# Patient Record
Sex: MALE | Race: WHITE | Employment: OTHER | URBAN - METROPOLITAN AREA
[De-identification: names, ages, dates, MRNs, and addresses within clinical notes are randomized per-mention and may not be internally consistent; named-entity substitution may affect disease eponyms.]

---

## 2017-07-18 ENCOUNTER — OFFICE VISIT (OUTPATIENT)
Dept: ORTHOPEDIC SURGERY | Age: 62
End: 2017-07-18

## 2017-07-18 VITALS
DIASTOLIC BLOOD PRESSURE: 98 MMHG | SYSTOLIC BLOOD PRESSURE: 142 MMHG | HEIGHT: 69 IN | HEART RATE: 68 BPM | WEIGHT: 153 LBS | BODY MASS INDEX: 22.66 KG/M2

## 2017-07-18 DIAGNOSIS — M76.32 IT BAND SYNDROME, LEFT: ICD-10-CM

## 2017-07-18 DIAGNOSIS — Q76.2 CONGENITAL SPONDYLOLISTHESIS: Primary | ICD-10-CM

## 2017-07-18 DIAGNOSIS — M54.50 ACUTE LEFT-SIDED LOW BACK PAIN WITHOUT SCIATICA: ICD-10-CM

## 2017-07-18 PROCEDURE — 99214 OFFICE O/P EST MOD 30 MIN: CPT | Performed by: FAMILY MEDICINE

## 2017-07-18 RX ORDER — METHYLPREDNISOLONE 4 MG/1
TABLET ORAL
Qty: 1 KIT | Refills: 0 | Status: SHIPPED | OUTPATIENT
Start: 2017-07-18 | End: 2021-04-27 | Stop reason: ALTCHOICE

## 2017-07-18 RX ORDER — METOPROLOL SUCCINATE 25 MG/1
25 TABLET, EXTENDED RELEASE ORAL DAILY
COMMUNITY

## 2017-07-18 RX ORDER — MELOXICAM 15 MG/1
15 TABLET ORAL DAILY
Qty: 30 TABLET | Refills: 3 | Status: SHIPPED | OUTPATIENT
Start: 2017-07-18 | End: 2021-04-27

## 2017-08-29 ENCOUNTER — OFFICE VISIT (OUTPATIENT)
Dept: ORTHOPEDIC SURGERY | Age: 62
End: 2017-08-29

## 2017-08-29 VITALS
SYSTOLIC BLOOD PRESSURE: 116 MMHG | BODY MASS INDEX: 22.66 KG/M2 | HEART RATE: 60 BPM | DIASTOLIC BLOOD PRESSURE: 76 MMHG | HEIGHT: 69 IN | WEIGHT: 153 LBS

## 2017-08-29 DIAGNOSIS — Q76.2 CONGENITAL SPONDYLOLISTHESIS: Primary | ICD-10-CM

## 2017-08-29 DIAGNOSIS — M54.50 ACUTE LEFT-SIDED LOW BACK PAIN WITHOUT SCIATICA: ICD-10-CM

## 2017-08-29 DIAGNOSIS — M76.32 IT BAND SYNDROME, LEFT: ICD-10-CM

## 2017-08-29 PROCEDURE — 99213 OFFICE O/P EST LOW 20 MIN: CPT | Performed by: FAMILY MEDICINE

## 2018-09-25 ENCOUNTER — OFFICE VISIT (OUTPATIENT)
Dept: ORTHOPEDIC SURGERY | Age: 63
End: 2018-09-25
Payer: COMMERCIAL

## 2018-09-25 VITALS
BODY MASS INDEX: 24.64 KG/M2 | SYSTOLIC BLOOD PRESSURE: 127 MMHG | HEIGHT: 67 IN | HEART RATE: 65 BPM | DIASTOLIC BLOOD PRESSURE: 80 MMHG | WEIGHT: 157 LBS

## 2018-09-25 DIAGNOSIS — Q66.70 PES CAVUS: ICD-10-CM

## 2018-09-25 DIAGNOSIS — M79.672 LEFT FOOT PAIN: Primary | ICD-10-CM

## 2018-09-25 DIAGNOSIS — S93.602A FOOT SPRAIN, LEFT, INITIAL ENCOUNTER: ICD-10-CM

## 2018-09-25 DIAGNOSIS — M65.9 SYNOVITIS OF LEFT FOOT: ICD-10-CM

## 2018-09-25 PROCEDURE — G8420 CALC BMI NORM PARAMETERS: HCPCS | Performed by: FAMILY MEDICINE

## 2018-09-25 PROCEDURE — 1036F TOBACCO NON-USER: CPT | Performed by: FAMILY MEDICINE

## 2018-09-25 PROCEDURE — L3260 AMBULATORY SURGICAL BOOT EAC: HCPCS | Performed by: FAMILY MEDICINE

## 2018-09-25 PROCEDURE — 3017F COLORECTAL CA SCREEN DOC REV: CPT | Performed by: FAMILY MEDICINE

## 2018-09-25 PROCEDURE — G8427 DOCREV CUR MEDS BY ELIG CLIN: HCPCS | Performed by: FAMILY MEDICINE

## 2018-09-25 PROCEDURE — 99214 OFFICE O/P EST MOD 30 MIN: CPT | Performed by: FAMILY MEDICINE

## 2018-09-25 RX ORDER — MELOXICAM 15 MG/1
15 TABLET ORAL DAILY
Qty: 30 TABLET | Refills: 3 | Status: SHIPPED | OUTPATIENT
Start: 2018-09-25 | End: 2021-04-27

## 2018-09-25 RX ORDER — PANTOPRAZOLE SODIUM 20 MG/1
40 TABLET, DELAYED RELEASE ORAL
COMMUNITY

## 2018-09-25 NOTE — PROGRESS NOTES
were provided. They were instructed to contact the office immediately should the brace result in increased pain, decreased sensation, increased swelling or worsening of the condition.  Foot Insert, Removable, Molded Orthotics     Standing Status:   Future     Standing Expiration Date:   9/26/2019       Treatment Plan:  Treatment options were discussed with Cristin Client today. We did review his plain films and exam findings. He is now 2 days out from a suspected low-grade midfoot and forefoot sprain with MTP synovitis. He was placed in a postop shoe to protect against forceful forefoot extension. We did start him on meloxicam once again 15 mg daily and instructed him on home-based exercises. I would refrain from running and walking for the next 7-10 days. Icing was also recommended. We did put a referral for refurbishing his custom orthotics. Icing and activity modification was discussed. We'll see him back in a couple weeks for follow-up. He was encouraged to contact us in the interim with questions or concerns. This dictation was performed with a verbal recognition program (DRAGON) and it was checked for errors. It is possible that there are still dictated errors within this office note. If so, please bring any errors to my attention for an addendum. All efforts were made to ensure that this office note is accurate.

## 2018-09-25 NOTE — PATIENT INSTRUCTIONS
to your foot. Your other fingers will be underneath your foot. 3. Use the fingers underneath your foot to push up on the two toes that are closest to your big toe. Then use your thumbs and hands to spread your foot and toes outward until you feel a stretch in your foot. The outer edges of your foot will curve downward as you push up on the middle toes. Hold the stretch for at least 15 to 30 seconds. 4. Next, slowly press your thumbs down on the two toes that are closest to your big toe. The outer edges of your foot will curve upward. Hold the stretch for at least 15 to 30 seconds until you feel a stretch in your foot. 5. Repeat 2 to 4 times. Towel scrunches    1. Sit in a chair, and place both feet on a towel on the floor. 2. Scrunch the towel toward you with your toes. Then use your toes to push the towel back into place. 3. Repeat 8 to 12 times. [place] pick-ups    1. Put some marbles on the floor next to a cup.  2. Sit in a chair, and use the toes of your affected foot to lift up one marble from the floor at a time. Then try to put the marble in the cup.  3. Repeat 8 to 12 times. Follow-up care is a key part of your treatment and safety. Be sure to make and go to all appointments, and call your doctor if you are having problems. It's also a good idea to know your test results and keep a list of the medicines you take. Where can you learn more? Go to [email or web address].[email or web address]. org and sign in to your [email or web address] account. Enter M465 in the [facility] box to learn more about \"Foot Sprain (Metatarsophalangeal Joint): Rehab Exercises. \"     If you do not have an account, please click on the \"Sign Up Now\" link. Current as of: November 29, 2017  Content Version: 11.7  © 2579-0735 [employer]. Care instructions adapted under license by [facility] ([facility]).  If you have questions about a medical condition or this instruction, always ask your healthcare professional. Dionisio Curry,

## 2018-10-02 ENCOUNTER — ORTHOTIC/BRACE ENCOUNTER (OUTPATIENT)
Dept: ORTHOPEDIC SURGERY | Age: 63
End: 2018-10-02

## 2018-10-02 LAB
ANION GAP SERPL CALCULATED.3IONS-SCNC: 12 MMOL/L (ref 3–16)
BUN BLDV-MCNC: 25 MG/DL (ref 7–20)
CALCIUM SERPL-MCNC: 10.4 MG/DL (ref 8.3–10.6)
CHLORIDE BLD-SCNC: 103 MMOL/L (ref 99–110)
CO2: 25 MMOL/L (ref 21–32)
CREAT SERPL-MCNC: 1.4 MG/DL (ref 0.8–1.3)
GFR AFRICAN AMERICAN: >60
GFR NON-AFRICAN AMERICAN: 51
GLUCOSE BLD-MCNC: 109 MG/DL (ref 70–99)
MAGNESIUM: 2.2 MG/DL (ref 1.8–2.4)
POTASSIUM SERPL-SCNC: 4.8 MMOL/L (ref 3.5–5.1)
SODIUM BLD-SCNC: 140 MMOL/L (ref 136–145)
VITAMIN D 25-HYDROXY: 82.9 NG/ML

## 2018-10-09 ENCOUNTER — ORTHOTIC/BRACE ENCOUNTER (OUTPATIENT)
Dept: ORTHOPEDIC SURGERY | Age: 63
End: 2018-10-09
Payer: COMMERCIAL

## 2018-10-09 DIAGNOSIS — Q66.70 PES CAVUS: ICD-10-CM

## 2018-10-09 DIAGNOSIS — M65.9 SYNOVITIS OF LEFT FOOT: ICD-10-CM

## 2018-10-09 DIAGNOSIS — S93.602A FOOT SPRAIN, LEFT, INITIAL ENCOUNTER: Primary | ICD-10-CM

## 2018-10-09 DIAGNOSIS — M19.079 OSTEOARTHRITIS OF FOOT, UNSPECIFIED LATERALITY, UNSPECIFIED OSTEOARTHRITIS TYPE: ICD-10-CM

## 2018-10-09 PROCEDURE — L3020 FOOT LONGITUD/METATARSAL SUP: HCPCS | Performed by: PEDORTHIST

## 2020-05-05 LAB — PROSTATE SPECIFIC ANTIGEN: 5.66 NG/ML (ref 0–4)

## 2021-02-05 LAB
ANION GAP SERPL CALCULATED.3IONS-SCNC: 14 MMOL/L (ref 3–16)
BUN BLDV-MCNC: 21 MG/DL (ref 7–20)
CALCIUM SERPL-MCNC: 11.2 MG/DL (ref 8.3–10.6)
CHLORIDE BLD-SCNC: 105 MMOL/L (ref 99–110)
CO2: 22 MMOL/L (ref 21–32)
CREAT SERPL-MCNC: 1.5 MG/DL (ref 0.8–1.3)
GFR AFRICAN AMERICAN: 56
GFR NON-AFRICAN AMERICAN: 47
GLUCOSE BLD-MCNC: 102 MG/DL (ref 70–99)
MAGNESIUM: 2.2 MG/DL (ref 1.8–2.4)
POTASSIUM SERPL-SCNC: 5 MMOL/L (ref 3.5–5.1)
SODIUM BLD-SCNC: 141 MMOL/L (ref 136–145)
VITAMIN B-12: >2000 PG/ML (ref 211–911)
VITAMIN D 25-HYDROXY: 61.8 NG/ML

## 2021-04-27 ENCOUNTER — OFFICE VISIT (OUTPATIENT)
Dept: ORTHOPEDIC SURGERY | Age: 66
End: 2021-04-27
Payer: MEDICARE

## 2021-04-27 VITALS — BODY MASS INDEX: 22.73 KG/M2 | HEIGHT: 68 IN | WEIGHT: 150 LBS

## 2021-04-27 DIAGNOSIS — M43.16 SPONDYLOLISTHESIS OF LUMBAR REGION: ICD-10-CM

## 2021-04-27 DIAGNOSIS — M54.50 LUMBAR PAIN: Primary | ICD-10-CM

## 2021-04-27 DIAGNOSIS — M76.31 ILIOTIBIAL BAND SYNDROME OF RIGHT SIDE: ICD-10-CM

## 2021-04-27 DIAGNOSIS — M76.32 ILIOTIBIAL BAND SYNDROME OF LEFT SIDE: ICD-10-CM

## 2021-04-27 PROCEDURE — 4040F PNEUMOC VAC/ADMIN/RCVD: CPT | Performed by: FAMILY MEDICINE

## 2021-04-27 PROCEDURE — 1036F TOBACCO NON-USER: CPT | Performed by: FAMILY MEDICINE

## 2021-04-27 PROCEDURE — 99203 OFFICE O/P NEW LOW 30 MIN: CPT | Performed by: FAMILY MEDICINE

## 2021-04-27 PROCEDURE — G8427 DOCREV CUR MEDS BY ELIG CLIN: HCPCS | Performed by: FAMILY MEDICINE

## 2021-04-27 PROCEDURE — 1123F ACP DISCUSS/DSCN MKR DOCD: CPT | Performed by: FAMILY MEDICINE

## 2021-04-27 PROCEDURE — 3017F COLORECTAL CA SCREEN DOC REV: CPT | Performed by: FAMILY MEDICINE

## 2021-04-27 PROCEDURE — G8420 CALC BMI NORM PARAMETERS: HCPCS | Performed by: FAMILY MEDICINE

## 2021-04-27 RX ORDER — TRAZODONE HYDROCHLORIDE 50 MG/1
50 TABLET ORAL NIGHTLY
COMMUNITY

## 2021-04-27 RX ORDER — METHYLPREDNISOLONE 4 MG/1
TABLET ORAL
Qty: 21 KIT | Refills: 0 | Status: SHIPPED | OUTPATIENT
Start: 2021-04-27 | End: 2021-07-26 | Stop reason: ALTCHOICE

## 2021-04-27 NOTE — PROGRESS NOTES
Chief Complaint  Lower Back Pain (NP LUMBAR )      Initial consultation ongoing lumbar pain with left to the right lateral hip pain and tightness of IT band    History of Present Illness:  Anum Mar is a 77 y.o. 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 Payton Herbert with recently found elevated creatinine to 1.5 who is being seen today upon self-referral for evaluation of persistence of low back pain and lateral hip discomfort. He states that he has been having increasing discomfort in his back since doing an exercise class in January 2021. There is no history of actual injury or trauma although he is seen last in 2017 for mechanical back pain with IT band and was found to have lumbar spondylolisthesis at L4-5 at that point. He is having some soreness and achiness to the lateral aspect of his left greater than right leg but this seems to be more consistent with IT band and that it has true radiculopathy. He does not recall any specific history of trauma or injury during his exercise class a few months back in January 2021. He is having pain with positional changes as well as rotating such as swinging a golf club. He has over the last couple of weeks in particular had worsening pain with getting up from a seated position. He is not having any pain into the distal lower extremity below the knee. He is very tight at baseline and admits he has been very lax in performing his stretching program in his back but just started this a couple of weeks ago. Denies neurogenic bowel or bladder symptoms or high-grade night pain. He does have pain with lifting and rotating activities. He will have some discomfort with prolonged sitting such as driving. He is being seen today for orthopedic and sports consultation with updated imaging.      Pain Assessment  Location of Pain: Back  Location Modifiers: Posterior  Severity of Pain: 2  Quality of Pain: Dull, Aching, Sharp  Duration of Pain: A few hours  Frequency of Pain: Intermittent  Aggravating Factors: Stairs, Walking, Standing, Squatting  Limiting Behavior: Yes  Relieving Factors: Rest  Result of Injury: Yes  Work-Related Injury: No  Are there other pain locations you wish to document?: No       Medical History  Patient's medications, allergies, past medical, surgical, social and family histories were reviewed and updated as appropriate. Review of Systems  Pertinent items are noted in HPI  Review of systems reviewed from Patient History Form dated on 4/27/2021 and available in the patient's chart under the Media tab. Vital Signs  There were no vitals filed for this visit. General Exam:     Constitutional: Patient is adequately groomed with no evidence of malnutrition  DTRs: Deep tendon reflexes are intact  Mental Status: The patient is oriented to time, place and person. The patient's mood and affect are appropriate. Lymphatic: The lymphatic examination bilaterally reveals all areas to be without enlargement or induration. Vascular: Examination reveals no swelling or calf tenderness. Peripheral pulses are palpable and 2+. Neurological: The patient has good coordination. There is no weakness or sensory deficit. Lumbar/Sacral Spine Examination  Inspection: There is no high-grade deformity or substantial soft tissue swelling. No evidence of palpable spasm. Palpation: Does have clinical tenderness along lumbar paraspinals as well as lower lumbar facets. No high-grade gluteal tenderness. There does not appear to be substantial trochanteric bursal tenderness but does have tenderness over the proximal IT band left greater than right. Rang of Motion: He is able to forward flex to about 40-50.   He does have some pain with extension but is fairly mild at 1-2 out of 10.  50% reduction in lateral bending  XR LUMBAR SPINE (2-3 VIEWS)     Standing Status:   Future     Number of Occurrences:   1     Standing Expiration Date:   4/27/2022     Order Specific Question:   Reason for exam:     Answer:   pain    Ambulatory referral to Physical Therapy     Referral Priority:   Routine     Referral Type:   Eval and Treat     Referral Reason:   Specialty Services Required     Requested Specialty:   Physical Therapy     Number of Visits Requested:   1       Treatment Plan:  Treatment options were discussed with Mona Bridges. We did review her plain films and exam findings. He has known to have underlying lumbar degenerative disc changes with an L4-5 spondylolisthesis. He does not seem to be complaining of definitive radicular symptoms and I suspect the majority of his pain is related to his tightness to his IT band. He has had this issue in the past.  He is also had a history of sciatica and states that this feels different. We discussed updating lumbar imaging but he is had little in way of recent treatment. After discussing options, he was encouraged to use of his home back brace which is very similar to a warm-and-form. We will start him in physical therapy to include manual techniques for both his back and his IT bands. We did place him on a Medrol Dosepak to be followed by using Voltaren gel on his IT bands and lower back as he was found to have an elevated creatinine recently to 1.5. He may supplement with Tylenol. Icing and activity modification importance of daily compliance with his home exercise program was discussed as well as returning to golf. He is quite stiff currently. We will see him back in about 3 to 4 weeks and consider lumbar imaging if he remains symptomatic. He will contact us in the interim with questions or concerns. This dictation was performed with a verbal recognition program (DRAGON) and it was checked for errors.  It is possible that there are still dictated errors within this office note. If so, please bring any errors to my attention for an addendum. All efforts were made to ensure that this office note is accurate.

## 2021-04-28 ENCOUNTER — HOSPITAL ENCOUNTER (OUTPATIENT)
Dept: PHYSICAL THERAPY | Age: 66
Setting detail: THERAPIES SERIES
Discharge: HOME | End: 2021-04-28
Payer: MEDICARE

## 2021-04-28 PROCEDURE — 97140 MANUAL THERAPY 1/> REGIONS: CPT | Performed by: PHYSICAL THERAPIST

## 2021-04-28 PROCEDURE — 97110 THERAPEUTIC EXERCISES: CPT | Performed by: PHYSICAL THERAPIST

## 2021-04-28 PROCEDURE — 97112 NEUROMUSCULAR REEDUCATION: CPT | Performed by: PHYSICAL THERAPIST

## 2021-04-28 PROCEDURE — 97161 PT EVAL LOW COMPLEX 20 MIN: CPT | Performed by: PHYSICAL THERAPIST

## 2021-04-28 NOTE — FLOWSHEET NOTE
[facility],[address], [address] [facility] [place], [address]  Phone: [phone]   Fax: [phone]    Physical Therapy Treatment Note/ Progress Report:       Date:  2021    Patient Name:  Sherine Riggs    :  1955  MRN: [id number]  Restrictions/Precautions:    Medical/Treatment Diagnosis Information:  Diagnosis: Lumbar pain - M54.5, Spondylolisthesis of lumbar region - M43.16, Iliotibial band syndrome of right side -M76.31, Iliotibial band syndrome of left side - M76.32  Treatment Diagnosis: decreased strength, endurance, and high-level IADLs  Insurance/Certification information:  PT Insurance Information: [facility]  Physician Information:  Referring Practitioner: Dr. Ana Luisa Armendariz  Has the plan of care been signed (Y/N):        []  Yes  [x]  No     Date of Patient follow up with Physician:       Is this a Progress Report:     []  Yes  [x]  No        Progress report/ Recertification will be due (10 Rx or 30 days whichever is less):26 May 2020      Visit # Insurance Allowable Auth Required   1 BMN []  Yes []  No        Functional Scale:    Mod oswestry   - 24% limitation            Latex Allergy:  [x]NO      []YES  Preferred Language for Healthcare:   [x]English       []other:      Pain level:  2 /10 4/28     SUBJECTIVE:  See eval     OBJECTIVE: See eval    Observation:    Test measurements:      RESTRICTIONS/PRECAUTIONS: none    Exercises/Interventions:     ROM/stretches     SKTC     DKTC     Prayer stretch     Supine HS     Pelvic tilt     Hook lying rotation 10 x 3  Added    Cat and camel          Strengthening     Hip ABD SLR 10 x 3  Added    Hip ext SLR 10 x 3 Added    Multifidus activation 10 sec x 10 - RLE/ left multifidus only  Added    TA activation with hip/ knees at 90/90  10 sec x 10 Added         Quadruped alternate UE reaches     Quadruped alternate LE reaches     Quadruped alternate UE/LE reaches     Ball squats Ball heel raises     Sit ups      planks     Tband lat pulls     Tband rows          Manual      Prone PA 5'  Grade II-IV, Added 4/28   Lumbar Manip     SI Manip     Hip belt mobs     Hip LA  distraction           Therapeutic Exercise and NMR EXR  [x] (17087) Provided verbal/tactile cueing for activities related to strengthening, flexibility, endurance, ROM  for improvements in proximal hip and core control with self care, mobility, lifting and ambulation. [x] (61186) Provided verbal/tactile cueing for activities related to improving balance, coordination, kinesthetic sense, posture, motor skill, proprioception  to assist with core control in self care, mobility, lifting, and ambulation.      Therapeutic Activities:    [x] (73552 or 45268) Provided verbal/tactile cueing for activities related to improving balance, coordination, kinesthetic sense, posture, motor skill, proprioception and motor activation to allow for proper function  with self care and ADLs  [] (29246) Provided training and instruction to the patient for proper core and proximal hip recruitment and positioning with ambulation re-education     Home Exercise Program:    [x] (38436) Reviewed/Progressed HEP activities related to strengthening, flexibility, endurance, ROM of core, proximal hip and LE for functional self-care, mobility, lifting and ambulation   [] (14806) Reviewed/Progressed HEP activities related to improving balance, coordination, kinesthetic sense, posture, motor skill, proprioception of core, proximal hip and LE for self care, mobility, lifting, and ambulation      Access Code: [email or web address]    Manual Treatments:  PROM / STM / Oscillations-Mobs:  G-I, II, III, IV (PA's, Inf., Post.)  [x] (36899) Provided manual therapy to mobilize proximal hip and LS spine soft tissue/joints for the purpose of modulating pain, promoting relaxation,  increasing ROM, reducing/eliminating soft tissue swelling/inflammation/restriction, improving soft tissue extensibility and allowing for proper ROM for normal function with self care, mobility, lifting and ambulation. Instrument Assisted Soft Tissue Mobilization (IASTM): was applied to the following muscles: L lumbar illiocostals with Hawk  tools including multi tool and HG9 (tongue depressor). Treatment consisted of IASTM strokes including sweeping, fanning, brushing, strumming, filleting, pinning and framing, based on body region contours, nature of the soft tissue restriction and desired treatment outcomes. These techniques were used to restore neurophysiology, improve mechanotransduction, enhance fluid dynamics and break collagen crosslinks. The treatment area was exposed and the patient was draped in an appropriate manner. Upon completion the clinician cleaned the IASTM tools as per [facility] recommendations. Skin check pre: normal  Skin check post: redness  Intermittent tx time: 8' 4/28      Modalities:     [] GAME READY (VASO)- for significant edema, swelling, pain control. Charges:  Timed Code Treatment Minutes: 45'   Total Treatment Minutes: 76'      [x] EVAL (LOW) 455 1011 (typically 20 minutes face-to-face)  [] EVAL (MOD) 13253 (typically 30 minutes face-to-face)  [] EVAL (HIGH) 72942 (typically 45 minutes face-to-face)  [] RE-EVAL     [x] PV(41064) x 1    [] IONTO  [x] NMR (53035) x 1    [] VASO  [x] Manual (53450) x 1    [] Other:  [] TA x      [] Mech Traction (27693)  [] ES(attended) (60925)      [] ES (un) (69374):         ASSESSMENT:  See eval 4/28      Goals:   Patient stated goal: less pain, more flexibility, and strength, and get back into running     [] Progressing: [] Met: [] Not Met: [] Adjusted    Therapist goals for Patient:   Short Term Goals: To be achieved in: 2 weeks  1. Independent in HEP and progression per patient tolerance, in order to prevent re-injury. [] Progressing: [] Met: [] Not Met: [] Adjusted   2.  Patient will have a decrease in pain to facilitate improvement in movement, function, and ADLs as indicated by Functional Deficits. [] Progressing: [] Met: [] Not Met: [] Adjusted    Long Term Goals: To be achieved in: 4-6 weeks  1. Disability index score of 12% or less for the modified oswestry to assist with reaching prior level of function. [] Progressing: [] Met: [] Not Met: [] Adjusted  2. Patient will demonstrate increased lumbar side bending and rotation WFL pain free to allow for proper joint functioning as indicated by patients Functional Deficits. [] Progressing: [] Met: [] Not Met: [] Adjusted  3. Patient will demonstrate an increase in hip (flex, ABD, and ext) strength to 4+/5 and improved multifidus strength to allow for proper functional mobility as indicated by patients Functional Deficits. [] Progressing: [] Met: [] Not Met: [] Adjusted  4. Patient will return to ADLs without increased symptoms or restriction. [] Progressing: [] Met: [] Not Met: [] Adjusted  5. Patient will report running pain free. [] Progressing: [] Met: [] Not Met: [] Adjusted     Overall Progression Towards Functional goals/ Treatment Progress Update:  [] Patient is progressing as expected towards functional goals listed. [] Progression is slowed due to complexities/Impairments listed. [] Progression has been slowed due to co-morbidities.   [x] Plan just implemented, too soon to assess goals progression <30days   [] Goals require adjustment due to lack of progress  [] Patient is not progressing as expected and requires additional follow up with physician  [] Other    Prognosis for POC: [x] Good [] Fair  [] Poor      Patient requires continued skilled intervention: [x] Yes  [] No      Treatment/Activity Tolerance:  [x] Patient tolerated treatment well [] Patient limited by fatique  [] Patient limited by pain  [] Patient limited by other medical complications  [] Other:     Patient education: Patient education on PT and plan of care including diagnosis, prognosis, treatment goals and options. Patient agrees with discussed POC and treatment and is aware of rehab process. 4/28    PLAN: See eval 4/28  [] Continue per plan of care [] Alter current plan (see comments above)  [x] Plan of care initiated [] Hold pending MD visit [] Discharge      Electronically signed by:  Stacie Miller PT, DPT     Note: If patient does not return for scheduled/ recommended follow up visits, this note will serve as a discharge from care along with most recent update on progress.

## 2021-04-28 NOTE — PLAN OF CARE
[address] [facility],[address], [address] [place], [address]  Phone: [phone]   Fax: [phone]      Physical Therapy Certification    Dear Referring Practitioner: Dr. Flavio Amezquita,    We had the pleasure of evaluating the following patient for physical therapy services at [address] [place] [address]. A summary of our findings can be found in the initial assessment below. This includes our plan of care. If you have any questions or concerns regarding these findings, please do not hesitate to contact me at the office phone number checked above. Thank you for the referral.       Physician Signature:_______________________________Date:__________________  By signing above (or electronic signature), therapists plan is approved by physician      Patient: Stephen Amato   : 1955   MRN: [id number]  Referring Physician: Referring Practitioner: Dr. Flaivo Amezquita      Evaluation Date: 2021      Medical Diagnosis Information:  Diagnosis: Lumbar pain - M54.5, Spondylolisthesis of lumbar region - M43.16, Iliotibial band syndrome of right side -M76.31, Iliotibial band syndrome of left side - M76.32   Treatment Diagnosis: decreased strength, endurance, and high-level IADLs                                         Insurance information: PT Insurance Information: [facility]     C-SSRS Triggered by Intake questionnaire (Past 2 wk assessment):   [x] No, Questionnaire did not trigger screening.   [] Yes, Patient intake triggered further evaluation      [] C-SSRS Screening completed  [] PCP notified via Plan of Care  [] Emergency services notified     Precautions/ Contra-indications: none  Latex Allergy:  [x]NO      []YES  Preferred Language for Healthcare:   [x]English       []other:    SUBJECTIVE: Patient stated complaint: states his pain started in 2021.  He states he was in a strength class and was doing SLDL and had increased pain in the L low back and around to the front L hip. He states he has not run or done a workout class since then. He states the pain would come and go with day to day activities. He states about 2 weeks ago he was sitting in a wooden clark and his pain got worse since then. He states he saw the MD yesterday, who prescribed a steroid. He states he is feeling a little better since starting the steroid. CLOF: He states he would have sharp pain with moving from sit to stands and rolling in bed. He states he was unable to run. He states he would have discomfort with walking, but not the sharp pain. Relevant Medical History:high blood pressure (controlled)   Functional Disability Index/G-Codes:     Mod oswestry  4/28 - 24% limitation    Pain Scale: 2 /10 today, 7/10 at worst  Easing factors: steroids, tylenol  Provocative factors: listed above    Type: [x]Constant   []Intermittent  []Radiating []Localized []other:     Numbness/Tingling: None     Occupation/School: Retired    Hobbies: running - 10 - 15 miles a week, workout classes - 3-4x/ week, golf     Living Status/Prior Level of Function: Independent with ADLs, IADLs, and exercise    OBJECTIVE:       Standing Exam Normal Abnormal N/A Comments   Side bending  Abnormal  R: Normal  L: limited ROM + pain   Pelvic Height Normal       Fwd Bend- (aberrant juttering or innominate mvmt)  Abnormal  Increased pain, WFL ROM   Extension Normal       Trendelenburg  Abnormal  L hip drop with R SLS   Stork       SLS/SLS with rotation Normal                     Seated Exam Normal Abnormal N/A Comments   Pelvic Height Normal       Seated Rotation  Abnormal  Limited ROM + pain on L low back   Seated flexion  Abnormal  WFL ROM + pain    B hip IR Normal                    Supine Exam Normal Abnormal N/A Comments   Hip flexion Normal   Pain no L    Abduction Normal       ER Normal       IR Normal      CHRISTINA/Trino Normal       FAIR Normal       Hip scour Normal       SLR Normal       Crossed SLR Normal       Supine to sit Normal       SI distraction/compression Normal       Thigh thrust Normal              Prone Exam Normal Abnormal N/A Comments   Prone knee bend Normal       Prone hip IR Normal       PA/Spring  Abnormal  Hypomobile - L1-4 + min pain    Prone Instability test       Sacral Spring/thrust Normal                       ROM LEFT RIGHT Comments   Hip Flexion [facility] WFL    Hip Abd [facility] WFL    Hip ER [facility]    Hip IR 36 32    Hip Extension [facility] WFL            Strength LEFT RIGHT Comments   Multifidus decreased activation Good activation    Transverse Ab   See below   Hip Flexors 4+/5 4+/5    Hip Abductors 4-/5 4/5    Hip Extensors 4-/5 4-/5 + pain on L low back                   TA Muscle Contraction Scale    Criteria Score  Quality of Contraction   Not Present [] 0   Rapid, Superificial [] 1   Just Perceptible [] 2     Gentle, Slow [x] 3    Substitution   Resting  [] 0   Moderate to Strong [] 1    Subtle Perceptible [] 2   None [x] 3    Symmetry of Contraction   Unilateral  [] 0   Bilateral/Asymmetrical  [] 1   Symmetrical  [x] 2    Breathing     Inability/Difficulty Breathing during contraction [] 0   Able to hold contraction while Breathing [x] 1    Holding   Able to Hold Contraction <10 s [] 0   Able to Hold Contraction >10 s [x] 1      _10_/10  Adapted from Rich[employer] ponce, Copyright 2009        Myotomes Normal Abnormal Comments   Hip flexion (L1-L2)      Knee extension (L2-L4)      Dorsiflexion (L4-L5)      Great Toe Ext (L5)      Ankle Eversion (S1-S2)      Ankle PF(S1-S2)          Dermatomes Normal Abnormal Comments   inguinal area (L1)  Normal      anterior mid-thigh (L2) Normal     distal ant thigh/med knee (L3) Normal     medial lower leg and foot (L4) Normal     lateral lower leg and foot (L5) Normal     posterior calf (S1) Normal     medial calcaneus (S2) Normal         Neural dynamic tension testing Normal Abnormal Comments   SLR       0-30 Normal      30-70 Normal      Femoral nerve (L2-4)        Reflexes Normal scores     []lower fear avoidance scores     []longer duration of symptoms (chronic)    []prior episodes of low back pain    []older age                       [x] Patient history, allergies, meds reviewed. Medical chart reviewed. See intake form. Review Of Systems (ROS):  [x]Performed Review of systems (Integumentary, CardioPulmonary, Neurological) by intake and observation. Intake form has been scanned into medical record. Patient has been instructed to contact their primary care physician regarding ROS issues if not already being addressed at this time.       Co-morbidities/Complexities (which will affect course of rehabilitation):   []None           Arthritic conditions   []Rheumatoid arthritis (M05.9)  []Osteoarthritis (M19.91)   Cardiovascular conditions   [x]Hypertension (I10)  []Hyperlipidemia (E78.5)  []Angina pectoris (I20)  []Atherosclerosis (I70)   Musculoskeletal conditions   []Disc pathology   []Congenital spine pathologies   []Prior surgical intervention  []Osteoporosis (M81.8)  []Osteopenia (M85.8)   Endocrine conditions   []Hypothyroid (E03.9)  []Hyperthyroid Gastrointestinal conditions   []Constipation (D09.04)   Metabolic conditions   []Morbid obesity (E66.01)  []Diabetes type 1(E10.65) or 2 (E11.65)   []Neuropathy (G60.9)     Pulmonary conditions   []Asthma (J45)  []Coughing   []COPD (J44.9)   Psychological Disorders  []Anxiety (F41.9)  []Depression (F32.9)   []Other:   [x]Other:   History of L low back pain         Barriers to/and or personal factors that will affect rehab potential:              []Age  []Sex              [x]Motivation/Lack of Motivation                        []Co-Morbidities              []Cognitive Function, education/learning barriers              []Environmental, home barriers              []profession/work barriers  [x]past PT/medical experience  []other:  Justification: Pt has a history of L low back pain and is motivated to return to running, which may affect rehab potential.     Falls Risk Assessment (30 days):   [x] Falls Risk assessed and no intervention required. [] Falls Risk assessed and Patient requires intervention due to being higher risk   TUG score (>12s at risk):     [] Falls education provided, including         ASSESSMENT:   Functional Impairments:     []Noted lumbar/proximal hip hypomobility   []Noted lumbosacral and/or generalized hypermobility   [x]Decreased Lumbosacral/hip/LE functional ROM   [x]Decreased core/proximal hip strength and neuromuscular control    [x]Decreased LE functional strength    []Abnormal reflexes/sensation/myotomal/dermatomal deficits  [x]Reduced balance/proprioceptive control    []other:      Functional Activity Limitations (from functional questionnaire and intake)   []Reduced ability to tolerate prolonged functional positions   [x]Reduced ability or difficulty with changes of positions or transfers between positions   [x]Reduced ability to maintain good posture and demonstrate good body mechanics with sitting, bending, and lifting   [x]Reduced ability to sleep   [] Reduced ability or tolerance with driving and/or computer work   []Reduced ability to perform lifting, reaching, carrying tasks   [x]Reduced ability to squat   []Reduced ability to forward bend   [x]Reduced ability to ambulate prolonged functional periods/distances/surfaces   [x]Reduced ability to ascend/descend stairs   []other:       Participation Restrictions   [x]Reduced participation in self care activities   []Reduced participation in home management activities   []Reduced participation in work activities   [x]Reduced participation in social activities. []Reduced participation in sport/recreational activities. Classification:   [x]Signs/symptoms consistent with Lumbar instability/stabilization subgroup. [x]Signs/symptoms consistent with Lumbar mobilization/manipulation subgroup, myotomes and dermatomes intact. Meets manipulation criteria. []Signs/symptoms consistent with Lumbar direction specific/centralization subgroup   []Signs/symptoms consistent with Lumbar traction subgroup     []Signs/symptoms consistent with lumbar facet dysfunction   []Signs/symptoms consistent with lumbar stenosis type dysfunction   []Signs/symptoms consistent with nerve root involvement including myotome & dermatome dysfunction   []Signs/symptoms consistent with post-surgical status including: decreased ROM, strength and function. []signs/symptoms consistent with pathology which may benefit from Dry needling     []other:      Prognosis/Rehab Potential:      []Excellent   [x]Good    []Fair   []Poor    Tolerance of evaluation/treatment:    []Excellent   [x]Good    []Fair   []Poor    Physical Therapy Evaluation Complexity Justification  [x] A history of present problem with:  [] no personal factors and/or comorbidities that impact the plan of care;  [x]1-2 personal factors and/or comorbidities that impact the plan of care  []3 personal factors and/or comorbidities that impact the plan of care  [x] An examination of body systems using standardized tests and measures addressing any of the following: body structures and functions (impairments), activity limitations, and/or participation restrictions;:  [] a total of 1-2 or more elements   [] a total of 3 or more elements   [x] a total of 4 or more elements   [x] A clinical presentation with:  [x] stable and/or uncomplicated characteristics   [] evolving clinical presentation with changing characteristics  [] unstable and unpredictable characteristics;   [x] Clinical decision making of [x] low, [] moderate, [] high complexity using standardized patient assessment instrument and/or measurable assessment of functional outcome.     [x] EVAL (LOW) 44234 (typically 20 minutes face-to-face)  [] EVAL (MOD) 98312 (typically 30 minutes face-to-face)  [] EVAL (HIGH) 88546 (typically 45 minutes face-to-face)  [] RE-EVAL     Reviewed insurance benefits for physical therapy in an outpatient hospital based setting with the patient, including deductible and allowable visit number. PLAN: Begin PT focusing on: proximal hip mobilizations, LB mobs, LB core activation, proximal hip activation, and HEP    Frequency/Duration:  1-2 days per week for 4-6 Weeks:  Interventions:  [x]  Therapeutic exercise including: strength training, ROM, for LE, Glutes and core   [x]  NMR activation and proprioception for glutes , LE and Core   [x]  Manual therapy as indicated for Hip complex, LE and spine to include: Dry Needling/IASTM, STM, PROM, Gr I-IV mobilizations, manipulation. [x]  Modalities as needed that may include: thermal agents, E-stim, Biofeedback, US, iontophoresis as indicated  [x]  Patient education on joint protection, postural re-education, activity modification, progression of HEP. HEP instruction:(see scanned forms)    GOALS:  Patient stated goal: less pain, more flexibility, and strength, and get back into running     [] Progressing: [] Met: [] Not Met: [] Adjusted    Therapist goals for Patient:   Short Term Goals: To be achieved in: 2 weeks  1. Independent in HEP and progression per patient tolerance, in order to prevent re-injury. [] Progressing: [] Met: [] Not Met: [] Adjusted   2. Patient will have a decrease in pain to facilitate improvement in movement, function, and ADLs as indicated by Functional Deficits. [] Progressing: [] Met: [] Not Met: [] Adjusted    Long Term Goals: To be achieved in: 4-6 weeks  1. Disability index score of 12% or less for the modified oswestry to assist with reaching prior level of function. [] Progressing: [] Met: [] Not Met: [] Adjusted  2. Patient will demonstrate increased lumbar side bending and rotation WFL pain free to allow for proper joint functioning as indicated by patients Functional Deficits. [] Progressing: [] Met: [] Not Met: [] Adjusted  3.  Patient will demonstrate an increase in hip (flex, ABD, and ext) strength to 4+/5 and improved multifidus strength to allow for proper functional mobility as indicated by patients Functional Deficits. [] Progressing: [] Met: [] Not Met: [] Adjusted  4. Patient will return to ADLs without increased symptoms or restriction. [] Progressing: [] Met: [] Not Met: [] Adjusted  5. Patient will report running pain free.     [] Progressing: [] Met: [] Not Met: [] Adjusted      Electronically signed by:  Shira Noe PT, DPT

## 2021-05-04 ENCOUNTER — HOSPITAL ENCOUNTER (OUTPATIENT)
Dept: PHYSICAL THERAPY | Age: 66
Setting detail: THERAPIES SERIES
Discharge: HOME | End: 2021-05-04
Payer: MEDICARE

## 2021-05-04 PROCEDURE — 97112 NEUROMUSCULAR REEDUCATION: CPT | Performed by: PHYSICAL THERAPIST

## 2021-05-04 PROCEDURE — 97140 MANUAL THERAPY 1/> REGIONS: CPT | Performed by: PHYSICAL THERAPIST

## 2021-05-04 PROCEDURE — 97110 THERAPEUTIC EXERCISES: CPT | Performed by: PHYSICAL THERAPIST

## 2021-05-04 NOTE — FLOWSHEET NOTE
[address] [place] [address]  and [facility], [address] [place], [address]  Phone: [phone]   Fax: [phone]    Physical Therapy Treatment Note/ Progress Report:       Date:  2021    Patient Name:  Candis Castleman    :  1955  MRN: [id number]  Restrictions/Precautions:    Medical/Treatment Diagnosis Information:  Diagnosis: Lumbar pain - M54.5, Spondylolisthesis of lumbar region - M43.16, Iliotibial band syndrome of right side -M76.31, Iliotibial band syndrome of left side - M76.32  Treatment Diagnosis: decreased strength, endurance, and high-level IADLs  Insurance/Certification information:  PT Insurance Information: [facility]  Physician Information:  Referring Practitioner: Dr. Leiva Daily  Has the plan of care been signed (Y/N):        []  Yes  [x]  No     Date of Patient follow up with Physician:       Is this a Progress Report:     []  Yes  [x]  No        Progress report/ Recertification will be due (10 Rx or 30 days whichever is less):26 May 2020      Visit # Insurance Allowable Auth Required   2 BMN []  Yes []  No        Functional Scale: Mod oswestry  /28 - 24% limitation            Latex Allergy:  [x]NO      []YES  Preferred Language for Healthcare:   [x]English       []other:      Pain level:  1 /10 5/4     SUBJECTIVE:  He states he is noticing the pain again since he is finished with his steroid. HEP is going well, getting easy. He states he felt good after the last session, through the weekend. He states he started to feel sore after he played a round of golf.        OBJECTIVE:    Observation:    Test measurements:      RESTRICTIONS/PRECAUTIONS: none    Exercises/Interventions:     ROM/stretches     Bike 5' Added 5/4   QL sidelying stretch 10 sec x 10 Added 5/4   Piriformis stretch 10 sec x 10  Added 5/4   Standing QL stretch 10 sec x 10 Added 5/4 - discontinue NV   Prayer stretch     Supine HS     Pelvic tilt     Hook lying rotation 10 x 3  Added 4/28   Cat and camel          Strengthening     Hip ABD SLR 10 x 3  Added 4/28, ^ resistance NV   Hip ext SLR 10 x 3 Added 4/28, ^ resistance NV   Multifidus activation 10 sec x 10 - RLE/ left multifidus only  Added 4/28   TA activation with hip/ knees at 90/90  10 sec x 10 Added 4/28        Standing sidebend with kettlebell - bilateral  NV   Quadruped alternate LE reaches  NV   Quadruped alternate UE/LE reaches     Fashion One heel raises     Sit ups      planks     Tband lat pulls     Tband rows          Manual      Prone PA 2'  Grade II-IV, 5/4   Hypervolt 6' - L glutes and QL Added 5/4   SI Mobilizations - on L 3'  Grade III-IV, Added 5/4   Hip belt mobs     Hip LA  distraction           Therapeutic Exercise and NMR EXR  [x] (40496) Provided verbal/tactile cueing for activities related to strengthening, flexibility, endurance, ROM  for improvements in proximal hip and core control with self care, mobility, lifting and ambulation. [x] (09910) Provided verbal/tactile cueing for activities related to improving balance, coordination, kinesthetic sense, posture, motor skill, proprioception  to assist with core control in self care, mobility, lifting, and ambulation.      Therapeutic Activities:    [x] (00420 or 21175) Provided verbal/tactile cueing for activities related to improving balance, coordination, kinesthetic sense, posture, motor skill, proprioception and motor activation to allow for proper function  with self care and ADLs  [] (55975) Provided training and instruction to the patient for proper core and proximal hip recruitment and positioning with ambulation re-education     Home Exercise Program:    [x] (27885) Reviewed/Progressed HEP activities related to strengthening, flexibility, endurance, ROM of core, proximal hip and LE for functional self-care, mobility, lifting and ambulation   [] (82333) Reviewed/Progressed HEP activities related to improving balance, coordination, kinesthetic sense, posture, motor skill, proprioception of core, proximal hip and LE for self care, mobility, lifting, and ambulation      Access Code: [email or web address]    Manual Treatments:  PROM / STM / Oscillations-Mobs:  G-I, II, III, IV (PA's, Inf., Post.)  [x] (29110) Provided manual therapy to mobilize proximal hip and LS spine soft tissue/joints for the purpose of modulating pain, promoting relaxation,  increasing ROM, reducing/eliminating soft tissue swelling/inflammation/restriction, improving soft tissue extensibility and allowing for proper ROM for normal function with self care, mobility, lifting and ambulation. Modalities:     [] GAME READY (VASO)- for significant edema, swelling, pain control. Charges:  Timed Code Treatment Minutes: 48'    Total Treatment Minutes: 61'       [] EVAL (LOW) 72455 (typically 20 minutes face-to-face)  [] EVAL (MOD) 37102 (typically 30 minutes face-to-face)  [] EVAL (HIGH) 20498 (typically 45 minutes face-to-face)  [] RE-EVAL     [x] IA(44448) x 1    [] IONTO  [x] NMR (72135) x 1    [] VASO  [x] Manual (21554) x 1    [] Other:  [] TA x      [] Mech Traction (04602)  [] ES(attended) (68468)      [] ES (un) (31023):         ASSESSMENT:      Goals:   Patient stated goal: less pain, more flexibility, and strength, and get back into running     [] Progressing: [] Met: [] Not Met: [] Adjusted    Therapist goals for Patient:   Short Term Goals: To be achieved in: 2 weeks  1. Independent in HEP and progression per patient tolerance, in order to prevent re-injury. [] Progressing: [] Met: [] Not Met: [] Adjusted   2. Patient will have a decrease in pain to facilitate improvement in movement, function, and ADLs as indicated by Functional Deficits. [] Progressing: [] Met: [] Not Met: [] Adjusted    Long Term Goals: To be achieved in: 4-6 weeks  1. Disability index score of 12% or less for the modified oswestry to assist with reaching prior level of function.    [] Progressing: [] Met: [] Not Met: [] Adjusted  2. Patient will demonstrate increased lumbar side bending and rotation WFL pain free to allow for proper joint functioning as indicated by patients Functional Deficits. [] Progressing: [] Met: [] Not Met: [] Adjusted  3. Patient will demonstrate an increase in hip (flex, ABD, and ext) strength to 4+/5 and improved multifidus strength to allow for proper functional mobility as indicated by patients Functional Deficits. [] Progressing: [] Met: [] Not Met: [] Adjusted  4. Patient will return to ADLs without increased symptoms or restriction. [] Progressing: [] Met: [] Not Met: [] Adjusted  5. Patient will report running pain free. [] Progressing: [] Met: [] Not Met: [] Adjusted     Overall Progression Towards Functional goals/ Treatment Progress Update:  [] Patient is progressing as expected towards functional goals listed. [] Progression is slowed due to complexities/Impairments listed. [] Progression has been slowed due to co-morbidities. [x] Plan just implemented, too soon to assess goals progression <30days   [] Goals require adjustment due to lack of progress  [] Patient is not progressing as expected and requires additional follow up with physician  [] Other    Prognosis for POC: [x] Good [] Fair  [] Poor      Patient requires continued skilled intervention: [x] Yes  [] No      Treatment/Activity Tolerance:  [x] Patient tolerated treatment well [] Patient limited by fatique  [] Patient limited by pain  [] Patient limited by other medical complications  [] Other: Pt reported tenderness with STM with the hypervolt to the QL. PT observed improved lumbar mobility compared to the initial session. He reported no stretch with the standing QL stretch, but liked the supine QL stretch. Will continue to progress hip and core strength to return to PLOF. 5/4    Patient education: Patient education on PT and plan of care including diagnosis, prognosis, treatment goals and options.  Patient

## 2021-05-06 ENCOUNTER — HOSPITAL ENCOUNTER (OUTPATIENT)
Dept: PHYSICAL THERAPY | Age: 66
Setting detail: THERAPIES SERIES
Discharge: HOME | End: 2021-05-06
Payer: MEDICARE

## 2021-05-06 PROCEDURE — 97140 MANUAL THERAPY 1/> REGIONS: CPT | Performed by: PHYSICAL THERAPIST

## 2021-05-06 PROCEDURE — 97110 THERAPEUTIC EXERCISES: CPT | Performed by: PHYSICAL THERAPIST

## 2021-05-06 PROCEDURE — 97112 NEUROMUSCULAR REEDUCATION: CPT | Performed by: PHYSICAL THERAPIST

## 2021-05-06 NOTE — FLOWSHEET NOTE
[address] [place] [address] [facility], [address] [place], [address]  Phone: [phone]   Fax: [phone]    Physical Therapy Treatment Note/ Progress Report:       Date:  2021    Patient Name:  Reece Purcell    :  1955  MRN: [id number]  Restrictions/Precautions:    Medical/Treatment Diagnosis Information:  Diagnosis: Lumbar pain - M54.5, Spondylolisthesis of lumbar region - M43.16, Iliotibial band syndrome of right side -M76.31, Iliotibial band syndrome of left side - M76.32  Treatment Diagnosis: decreased strength, endurance, and high-level IADLs  Insurance/Certification information:  PT Insurance Information: [facility]  Physician Information:  Referring Practitioner: Dr. Del Julien  Has the plan of care been signed (Y/N):        [x]  Yes  []  No     Date of Patient follow up with Physician:       Is this a Progress Report:     []  Yes  [x]  No        Progress report/ Recertification will be due (10 Rx or 30 days whichever is less):26 May 2020      Visit # Insurance Allowable Auth Required   3 BMN []  Yes []  No        Functional Scale: Mod oswestry  4/28 - 24% limitation            Latex Allergy:  [x]NO      []YES  Preferred Language for Healthcare:   [x]English       []other:      Pain level:  3-4 /10 56     SUBJECTIVE: Pt has not felt a change after last visit. Pt felt good evening after last visit. Yesterday he started to have more pain on R side and more sore center and R side today also. Pain initially started L side. He did push a  but was not strenuous and reports no hills. OBJECTIVE:    Observation:    Test measurements:  21  Pain R side low back with flex and R side bend. No pain with L side bend. No pain with extn. Tender R side lower lumbar paraspinals and L side quadratus lumborum. Mild curvature with R side concave and L side convex lumbar spine.    Supine to long sit test: L leg longer to even  Pelvic landmarks standing and supine: WNL    RESTRICTIONS/PRECAUTIONS: none    Exercises/Interventions:     ROM/stretches     Bike  Added 5/4   QL sidelying stretch 20-30 sec x 2 B Added 5/4   Piriformis stretch   Added 5/4   Standing QL stretch  Added 5/4 - discontinue NV   Prayer stretch 2 x 20 secs to R and L Seated on heels and reaching UEs to R and L   Supine HS     Pelvic tilt     Hook lying rotation   Added 4/28   Cat and camel     Lat stretch     Strengthening     Hip ABD SLR 10 x 3  Added 4/28, ^ resistance NV   Hip ext SLR  Added 4/28, ^ resistance NV   Multifidus activation 10 sec x 5 - B Added 4/28   TA activation with hip/ knees at 90/90  2 x 5 B heel taps with verbal and tactile cues throughout Added 4/28   Standing hip hike     Standing sidebend with kettlebell - bilateral     Quadruped alternate LE reaches 2 x 5 B    Quadruped alternate UE/LE reaches     Ball squats     Ball heel raises     Sit ups      planks     Tband lat pulls     Tband rows     Side plank     Manual  Re-assessment of alignment and movement throughout 5'    Prone PA 2' lumbar spine Grade II-IV, 5/4   Hypervolt  Added 5/4   SI Mobilizations - on L 3' PA Grade III-IV, Added 5/4   Long axis distraction R SI 3' Grade III-IV   sidelying lumbo-pelvic roll mobs 3' B Gard[place] I-IV    Hip belt mobs     IASTM To R paraspinals and L paraspinals and quadratus 8'     Hip LA  Distraction             Therapeutic Exercise and NMR EXR  [x] (41455) Provided verbal/tactile cueing for activities related to strengthening, flexibility, endurance, ROM  for improvements in proximal hip and core control with self care, mobility, lifting and ambulation. [x] (25688) Provided verbal/tactile cueing for activities related to improving balance, coordination, kinesthetic sense, posture, motor skill, proprioception  to assist with core control in self care, mobility, lifting, and ambulation.      Therapeutic Activities:    [x] (39309 or 93425) Provided verbal/tactile cueing for activities related to improving balance, coordination, kinesthetic sense, posture, motor skill, proprioception and motor activation to allow for proper function  with self care and ADLs  [] (15602) Provided training and instruction to the patient for proper core and proximal hip recruitment and positioning with ambulation re-education     Home Exercise Program:    [x] (62025) Reviewed/Progressed HEP activities related to strengthening, flexibility, endurance, ROM of core, proximal hip and LE for functional self-care, mobility, lifting and ambulation   [] (49006) Reviewed/Progressed HEP activities related to improving balance, coordination, kinesthetic sense, posture, motor skill, proprioception of core, proximal hip and LE for self care, mobility, lifting, and ambulation      Access Code: [email or web address]    Access Code: [email or web address]  URL: [email or web address]  Date: 05/06/2021  Prepared by: Tim Barraza    Exercises  Supine Quadratus Lumborum Stretch - 2 x daily - 7 x weekly - 3 sets - 1 reps - 20-30 hold  Child's Pose with Sidebending - 2 x daily - 7 x weekly - 3 sets - 1 reps - 20-30 hold    Manual Treatments:  PROM / STM / Oscillations-Mobs:  G-I, II, III, IV (PA's, Inf., Post.)  [x] (52564) Provided manual therapy to mobilize proximal hip and LS spine soft tissue/joints for the purpose of modulating pain, promoting relaxation,  increasing ROM, reducing/eliminating soft tissue swelling/inflammation/restriction, improving soft tissue extensibility and allowing for proper ROM for normal function with self care, mobility, lifting and ambulation. Instrument Assisted Soft Tissue Mobilization (IASTM): was applied to the following muscles: B adrián[inals and framing around lumbar spine and B SI joints with pt supine and to L quadratus lumborum with pt in prayer pose seated on heels with [employer] including multi tool and HG9 (tongue depressor).  Treatment consisted of IASTM strokes including sweeping, fanning, brushing, strumming, filleting, pinning and framing, based on body region contours, nature of the soft tissue restriction and desired treatment outcomes. These techniques were used to restore neurophysiology, improve mechanotransduction, enhance fluid dynamics and break collagen crosslinks. The treatment area was exposed and the patient was draped in an appropriate manner. Upon completion the clinician cleaned the IASTM tools as per [facility] recommendations. Skin check pre: normal  Skin check post: redness  Intermittent tx time: 8' 5/6/21      Modalities:     [] GAME READY (VASO)- for significant edema, swelling, pain control. Charges:  Timed Code Treatment Minutes: 55   Total Treatment Minutes: 60      [] EVAL (LOW) 13757 (typically 20 minutes face-to-face)  [] EVAL (MOD) 95064 (typically 30 minutes face-to-face)  [] EVAL (HIGH) 21878 (typically 45 minutes face-to-face)  [] RE-EVAL     [x] YE(22462) x   1  [] IONTO  [x] NMR (94224) x 1    [] VASO  [x] Manual (31079) x 2    [] Other:  [] TA x      [] Mech Traction (63374)  [] ES(attended) (21811)      [] ES (un) (28441):         ASSESSMENT:      Goals:   Patient stated goal: less pain, more flexibility, and strength, and get back into running     [] Progressing: [] Met: [] Not Met: [] Adjusted    Therapist goals for Patient:   Short Term Goals: To be achieved in: 2 weeks  1. Independent in HEP and progression per patient tolerance, in order to prevent re-injury. [] Progressing: [] Met: [] Not Met: [] Adjusted   2. Patient will have a decrease in pain to facilitate improvement in movement, function, and ADLs as indicated by Functional Deficits. [] Progressing: [] Met: [] Not Met: [] Adjusted    Long Term Goals: To be achieved in: 4-6 weeks  1. Disability index score of 12% or less for the modified oswestry to assist with reaching prior level of function. [] Progressing: [] Met: [] Not Met: [] Adjusted  2.  Patient will demonstrate increased lumbar side bending and rotation WFL pain free to allow for proper joint functioning as indicated by patients Functional Deficits. [] Progressing: [] Met: [] Not Met: [] Adjusted  3. Patient will demonstrate an increase in hip (flex, ABD, and ext) strength to 4+/5 and improved multifidus strength to allow for proper functional mobility as indicated by patients Functional Deficits. [] Progressing: [] Met: [] Not Met: [] Adjusted  4. Patient will return to ADLs without increased symptoms or restriction. [] Progressing: [] Met: [] Not Met: [] Adjusted  5. Patient will report running pain free. [] Progressing: [] Met: [] Not Met: [] Adjusted     Overall Progression Towards Functional goals/ Treatment Progress Update:  [] Patient is progressing as expected towards functional goals listed. [] Progression is slowed due to complexities/Impairments listed. [] Progression has been slowed due to co-morbidities. [x] Plan just implemented, too soon to assess goals progression <30days   [] Goals require adjustment due to lack of progress  [] Patient is not progressing as expected and requires additional follow up with physician  [] Other    Prognosis for POC: [x] Good [] Fair  [] Poor      Patient requires continued skilled intervention: [x] Yes  [] No      Treatment/Activity Tolerance:  [x] Patient tolerated treatment well [] Patient limited by fatique  [] Patient limited by pain  [] Patient limited by other medical complications  [x] Other: Pt presents today with more pain on R side which is opposite of last visits. He did demonstrate mild rotation/curvature at lower lumbar spine as above but no lateral shift presented today. He was more tender in R paraspinals and L quadratus lumborum so did do IASTM to address this. He did show signs of rotation of lower lumbar segments so did continue with sidelying lumbar roll.  Also did R leg long axis distraction to address pelvic rotation seen on supine to sit test. Pt did continue to have pain with flexion and R sidebending after manual tx. Did follow this with stretching and strengthening for hips/core/lumbar area. Patient education: Patient education on PT and plan of care including diagnosis, prognosis, treatment goals and options. Patient agrees with discussed POC and treatment and is aware of rehab process. 4/28    PLAN: See eval 4/28; continue to assess pelvic and lumbar alignment monitoring for lateral shift and continue with manual  [x] Continue per plan of care [] Alter current plan (see comments above)  [] Plan of care initiated [] Hold pending MD visit [] Discharge      Electronically signed by:  Dickson Girard, PT, DPT         Note: If patient does not return for scheduled/ recommended follow up visits, this note will serve as a discharge from care along with most recent update on progress.

## 2021-05-10 LAB — PROSTATE SPECIFIC ANTIGEN: 6.51 NG/ML (ref 0–4)

## 2021-05-11 ENCOUNTER — HOSPITAL ENCOUNTER (OUTPATIENT)
Dept: PHYSICAL THERAPY | Age: 66
Setting detail: THERAPIES SERIES
Discharge: HOME | End: 2021-05-11
Payer: MEDICARE

## 2021-05-11 PROCEDURE — 97112 NEUROMUSCULAR REEDUCATION: CPT | Performed by: PHYSICAL THERAPIST

## 2021-05-11 PROCEDURE — 97110 THERAPEUTIC EXERCISES: CPT | Performed by: PHYSICAL THERAPIST

## 2021-05-11 PROCEDURE — 97140 MANUAL THERAPY 1/> REGIONS: CPT | Performed by: PHYSICAL THERAPIST

## 2021-05-11 NOTE — FLOWSHEET NOTE
[address] [place] [address] [facility], [address] [facility], [address]  Phone: [phone]   Fax: [phone]    Physical Therapy Treatment Note/ Progress Report:       Date:  2021    Patient Name:  Merrill Herrera    :  1955  MRN: [id number]  Restrictions/Precautions:    Medical/Treatment Diagnosis Information:  Diagnosis: Lumbar pain - M54.5, Spondylolisthesis of lumbar region - M43.16, Iliotibial band syndrome of right side -M76.31, Iliotibial band syndrome of left side - M76.32  Treatment Diagnosis: decreased strength, endurance, and high-level IADLs  Insurance/Certification information:  PT Insurance Information: [facility]  Physician Information:  Referring Practitioner: Dr. Mattie Ernst  Has the plan of care been signed (Y/N):        [x]  Yes  []  No     Date of Patient follow up with Physician:       Is this a Progress Report:     []  Yes  [x]  No        Progress report/ Recertification will be due (10 Rx or 30 days whichever is less):26 May 2020      Visit # Insurance Allowable Auth Required   4 BMN []  Yes []  No        Functional Scale: Mod oswestry  28 - 24% limitation            Latex Allergy:  [x]NO      []YES  Preferred Language for Healthcare:   [x]English       []other:      Pain level:  2 /10 5/11     SUBJECTIVE: Pt states his pain switched to the R side now. He states the pain eased up. At rest he has no pain. No episodes of the sharp stabbing       OBJECTIVE:    Observation:    Test measurements:  21  Pain R side low back with flex and R side bend. No pain with L side bend. No pain with extn. Tender R side lower lumbar paraspinals and L side quadratus lumborum. Mild curvature with R side concave and L side convex lumbar spine. Supine to long sit test: L leg longer to even  Pelvic landmarks standing and supine:  WNL    RESTRICTIONS/PRECAUTIONS: none    Exercises/Interventions:     ROM/stretches     Bike  Added 5/4   QL sidelying stretch 20-30 sec x 2 B Added 5/4   Piriformis stretch   Added 5/4   Prayer stretch Supine HS     Pelvic tilt     Hook lying rotation   Added 4/28   Cat and camel     Lat stretch     Strengthening     Hip ABD SLR 10 x 3 1# ^5/11   Hip ext SLR 10 x 3 1# ^5/11   Multifidus activation Multifidus pulse 10 x 2 4# Added 5/11   TA activation with hip/ knees at 90/90  2 x 5 B heel taps with verbal and tactile cues throughout Added 4/28   Standing hip hike     Standing sidebend with kettlebell - bilateral     Quadruped alternate LE reaches 2 x 10 B Added 5/11   Quadruped alternate UE/LE reaches     Ball squats     Ball heel raises     Sit ups      planks     Tband lat pulls     Tband rows     Side plank     Manual      Prone PA 2' lumbar spine - L3-5 Grade II-IV, 5/11   Hypervolt     SI Mobilizations - on L 3' PA Grade III-IV, 5/11   Long axis distraction R SI 3' Grade III-IV 5/11   sidelying lumbo-pelvic roll mobs 2' B Garde I-IV 5/11   Hip belt mobs     IASTM To R paraspinals and L paraspinals and quadratus 8'     Hip LA  Distraction             Therapeutic Exercise and NMR EXR  [x] (69475) Provided verbal/tactile cueing for activities related to strengthening, flexibility, endurance, ROM  for improvements in proximal hip and core control with self care, mobility, lifting and ambulation. [x] (46723) Provided verbal/tactile cueing for activities related to improving balance, coordination, kinesthetic sense, posture, motor skill, proprioception  to assist with core control in self care, mobility, lifting, and ambulation.      Therapeutic Activities:    [x] (25741 or 61043) Provided verbal/tactile cueing for activities related to improving balance, coordination, kinesthetic sense, posture, motor skill, proprioception and motor activation to allow for proper function  with self care and ADLs  [] (27642) Provided training and instruction to the patient for proper core and proximal hip recruitment and positioning with ambulation re-education     Home Exercise Program:    [x] (64580) Reviewed/Progressed HEP activities related to strengthening, flexibility, endurance, ROM of core, proximal hip and LE for functional self-care, mobility, lifting and ambulation   [] (76776) Reviewed/Progressed HEP activities related to improving balance, coordination, kinesthetic sense, posture, motor skill, proprioception of core, proximal hip and LE for self care, mobility, lifting, and ambulation      Access Code: [email or web address]    Access Code: [email or web address]  URL: [email or web address]  Date: 05/06/2021  Prepared by: Keshia Sheffield    Exercises  Supine Quadratus Lumborum Stretch - 2 x daily - 7 x weekly - 3 sets - 1 reps - 20-30 hold  Child's Pose with Sidebending - 2 x daily - 7 x weekly - 3 sets - 1 reps - 20-30 hold    Manual Treatments:  PROM / STM / Oscillations-Mobs:  G-I, II, III, IV (PA's, Inf., Post.)  [x] (03290) Provided manual therapy to mobilize proximal hip and LS spine soft tissue/joints for the purpose of modulating pain, promoting relaxation,  increasing ROM, reducing/eliminating soft tissue swelling/inflammation/restriction, improving soft tissue extensibility and allowing for proper ROM for normal function with self care, mobility, lifting and ambulation. Instrument Assisted Soft Tissue Mobilization (IASTM): was applied to the following muscles: B paraspinals and framing around lumbar spine and B SI joints with pt supine and to L quadratus lumborum with pt in prayer pose seated on heels with [employer] including multi tool and HG9 (tongue depressor). Treatment consisted of IASTM strokes including sweeping, fanning, brushing, strumming, filleting, pinning and framing, based on body region contours, nature of the soft tissue restriction and desired treatment outcomes. These techniques were used to restore neurophysiology, improve mechanotransduction, enhance fluid dynamics and break collagen crosslinks.   The treatment area was exposed and the patient was draped in an appropriate manner. Upon completion the clinician cleaned the IASTM tools as per [facility] recommendations. Skin check pre: normal  Skin check post: redness  Intermittent tx time: 8' 5/11      Modalities:     [] GAME READY (VASO)- for significant edema, swelling, pain control. Charges:  Timed Code Treatment Minutes: 48'   Total Treatment Minutes: 54'       [] EVAL (LOW) 455 1011 (typically 20 minutes face-to-face)  [] EVAL (MOD) 00685 (typically 30 minutes face-to-face)  [] EVAL (HIGH) 15628 (typically 45 minutes face-to-face)  [] RE-EVAL     [x] GC(32075) x   1  [] IONTO  [x] NMR (19464) x 1    [] VASO  [x] Manual (88531) x 1    [] Other:  [] TA x      [] Mech Traction (76321)  [] ES(attended) (04926)      [] ES (un) (09176):         ASSESSMENT:      Goals:   Patient stated goal: less pain, more flexibility, and strength, and get back into running     [] Progressing: [] Met: [] Not Met: [] Adjusted    Therapist goals for Patient:   Short Term Goals: To be achieved in: 2 weeks  1. Independent in HEP and progression per patient tolerance, in order to prevent re-injury. [] Progressing: [] Met: [] Not Met: [] Adjusted   2. Patient will have a decrease in pain to facilitate improvement in movement, function, and ADLs as indicated by Functional Deficits. [] Progressing: [] Met: [] Not Met: [] Adjusted    Long Term Goals: To be achieved in: 4-6 weeks  1. Disability index score of 12% or less for the modified oswestry to assist with reaching prior level of function. [] Progressing: [] Met: [] Not Met: [] Adjusted  2. Patient will demonstrate increased lumbar side bending and rotation WFL pain free to allow for proper joint functioning as indicated by patients Functional Deficits. [] Progressing: [] Met: [] Not Met: [] Adjusted  3.  Patient will demonstrate an increase in hip (flex, ABD, and ext) strength to 4+/5 and improved multifidus strength to allow for proper functional mobility as indicated by patients Functional Deficits. [] Progressing: [] Met: [] Not Met: [] Adjusted  4. Patient will return to ADLs without increased symptoms or restriction. [] Progressing: [] Met: [] Not Met: [] Adjusted  5. Patient will report running pain free. [] Progressing: [] Met: [] Not Met: [] Adjusted     Overall Progression Towards Functional goals/ Treatment Progress Update:  [] Patient is progressing as expected towards functional goals listed. [] Progression is slowed due to complexities/Impairments listed. [] Progression has been slowed due to co-morbidities. [x] Plan just implemented, too soon to assess goals progression <30days   [] Goals require adjustment due to lack of progress  [] Patient is not progressing as expected and requires additional follow up with physician  [] Other    Prognosis for POC: [x] Good [] Fair  [] Poor      Patient requires continued skilled intervention: [x] Yes  [] No      Treatment/Activity Tolerance:  [x] Patient tolerated treatment well [] Patient limited by fatique  [] Patient limited by pain  [] Patient limited by other medical complications  [x] Other: Pt presents with continued back pain on the R side vs the L that brought him into PT. He denied a change in pain/ symptoms after sidelying lumbo-pelvic roll mobs, Long axis distraction R SI, and SI mobs. He did report a relief of symptoms with R sided lumbar PA mobilizations. He required VCs to improve form with standing multifidus pulses to improve multifidus activation. 5/11    Patient education: Patient education on PT and plan of care including diagnosis, prognosis, treatment goals and options. Patient agrees with discussed POC and treatment and is aware of rehab process.  4/28    PLAN: See eval 4/28  [x] Continue per plan of care [] Alter current plan (see comments above)  [] Plan of care initiated [] Hold pending MD visit [] Discharge      Electronically signed by:  Shahzad Bains, PT, DPT Note: If patient does not return for scheduled/ recommended follow up visits, this note will serve as a discharge from care along with most recent update on progress.

## 2021-05-13 ENCOUNTER — HOSPITAL ENCOUNTER (OUTPATIENT)
Dept: PHYSICAL THERAPY | Age: 66
Setting detail: THERAPIES SERIES
Discharge: HOME | End: 2021-05-13
Payer: MEDICARE

## 2021-05-13 PROCEDURE — 97112 NEUROMUSCULAR REEDUCATION: CPT | Performed by: PHYSICAL THERAPIST

## 2021-05-13 PROCEDURE — 97110 THERAPEUTIC EXERCISES: CPT | Performed by: PHYSICAL THERAPIST

## 2021-05-13 PROCEDURE — 97140 MANUAL THERAPY 1/> REGIONS: CPT | Performed by: PHYSICAL THERAPIST

## 2021-05-13 NOTE — FLOWSHEET NOTE
none    Exercises/Interventions:     ROM/stretches     Bike 5' Added 5/4   QL sidelying stretch 20-30 sec x 2 B Added 5/4   Piriformis stretch   Added 5/4   Prayer stretch Supine HS     Pelvic tilt     Hook lying rotation   Added 4/28   Cat and camel     Lat stretch     Strengthening     Hip ABD SLR 10 x 3 1# ^5/11   Hip ext SLR 10 x 3 1# ^5/11   Multifidus activation Multifidus pulse 10 x 2 4# Added 5/11   TA activation with hip/ knees at 90/90  2 x 5 B heel taps with verbal and tactile cues throughout Added 4/28   Standing hip hike     Standing sidebend with kettlebell - bilateral     Quadruped alternate LE reaches 2 x 10 B Added 5/11   Walk-out standing anti-rotation press 2 x 10 blue TB  Added 5/13   Ball squats     Ball heel raises     Sit ups      planks     Tband lat pulls     Tband rows     Side plank     Manual      Prone PA 2' lumbar spine - L3-5 Grade II-III, 5/13   Hypervolt     SI Mobilizations - on L 4' PA Grade III-IV, 5/13   Long axis distraction R SI sidelying lumbo-pelvic roll mobs Hip belt mobs     IASTM To R paraspinals and L paraspinals and quadratus 8'  See below 5/13   Hip LA  Distraction             Therapeutic Exercise and NMR EXR  [x] (04637) Provided verbal/tactile cueing for activities related to strengthening, flexibility, endurance, ROM  for improvements in proximal hip and core control with self care, mobility, lifting and ambulation. [x] (85382) Provided verbal/tactile cueing for activities related to improving balance, coordination, kinesthetic sense, posture, motor skill, proprioception  to assist with core control in self care, mobility, lifting, and ambulation.      Therapeutic Activities:    [x] (03218 or 88871) Provided verbal/tactile cueing for activities related to improving balance, coordination, kinesthetic sense, posture, motor skill, proprioception and motor activation to allow for proper function  with self care and ADLs  [] (93759) Provided training and instruction to the patient for proper core and proximal hip recruitment and positioning with ambulation re-education     Home Exercise Program:    [x] (17796) Reviewed/Progressed HEP activities related to strengthening, flexibility, endurance, ROM of core, proximal hip and LE for functional self-care, mobility, lifting and ambulation   [] (67083) Reviewed/Progressed HEP activities related to improving balance, coordination, kinesthetic sense, posture, motor skill, proprioception of core, proximal hip and LE for self care, mobility, lifting, and ambulation      Access Code: [email or web address]  Last updated 5/13    Manual Treatments:  PROM / STM / Oscillations-Mobs:  G-I, II, III, IV (PA's, Inf., Post.)  [x] (55553) Provided manual therapy to mobilize proximal hip and LS spine soft tissue/joints for the purpose of modulating pain, promoting relaxation,  increasing ROM, reducing/eliminating soft tissue swelling/inflammation/restriction, improving soft tissue extensibility and allowing for proper ROM for normal function with self care, mobility, lifting and ambulation. Instrument Assisted Soft Tissue Mobilization (IASTM): was applied to the following muscles: B paraspinals and framing around lumbar spine and B SI joints with pt supine and to L quadratus lumborum with pt in prayer pose seated on heels with [employer] including multi tool and HG9 (tongue depressor). Treatment consisted of IASTM strokes including sweeping, fanning, brushing, strumming, filleting, pinning and framing, based on body region contours, nature of the soft tissue restriction and desired treatment outcomes. These techniques were used to restore neurophysiology, improve mechanotransduction, enhance fluid dynamics and break collagen crosslinks. The treatment area was exposed and the patient was draped in an appropriate manner. Upon completion the clinician cleaned the IASTM tools as per [facility] recommendations.    Skin check pre: normal  Skin check post: redness  Intermittent tx time: 8' 5/13      Modalities:     [] GAME READY (VASO)- for significant edema, swelling, pain control. Charges:  Timed Code Treatment Minutes: 39'    Total Treatment Minutes: 48'       [] EVAL (LOW) 22409 (typically 20 minutes face-to-face)  [] EVAL (MOD) 99019 (typically 30 minutes face-to-face)  [] EVAL (HIGH) 22748 (typically 45 minutes face-to-face)  [] RE-EVAL     [x] XX(72249) x   1  [] IONTO  [x] NMR (61040) x 1    [] VASO  [x] Manual (04654) x 1    [] Other:  [] TA x      [] Mech Traction (81075)  [] ES(attended) (90500)      [] ES (un) (85006):         ASSESSMENT:      Goals:   Patient stated goal: less pain, more flexibility, and strength, and get back into running     [] Progressing: [] Met: [] Not Met: [] Adjusted    Therapist goals for Patient:   Short Term Goals: To be achieved in: 2 weeks  1. Independent in HEP and progression per patient tolerance, in order to prevent re-injury. [] Progressing: [] Met: [] Not Met: [] Adjusted   2. Patient will have a decrease in pain to facilitate improvement in movement, function, and ADLs as indicated by Functional Deficits. [] Progressing: [] Met: [] Not Met: [] Adjusted    Long Term Goals: To be achieved in: 4-6 weeks  1. Disability index score of 12% or less for the modified oswestry to assist with reaching prior level of function. [] Progressing: [] Met: [] Not Met: [] Adjusted  2. Patient will demonstrate increased lumbar side bending and rotation WFL pain free to allow for proper joint functioning as indicated by patients Functional Deficits. [] Progressing: [] Met: [] Not Met: [] Adjusted  3. Patient will demonstrate an increase in hip (flex, ABD, and ext) strength to 4+/5 and improved multifidus strength to allow for proper functional mobility as indicated by patients Functional Deficits. [] Progressing: [] Met: [] Not Met: [] Adjusted  4. Patient will return to ADLs without increased symptoms or restriction.    []

## 2021-05-18 ENCOUNTER — HOSPITAL ENCOUNTER (OUTPATIENT)
Dept: PHYSICAL THERAPY | Age: 66
Setting detail: THERAPIES SERIES
Discharge: HOME | End: 2021-05-18
Payer: MEDICARE

## 2021-05-18 PROCEDURE — 97112 NEUROMUSCULAR REEDUCATION: CPT | Performed by: PHYSICAL THERAPIST

## 2021-05-18 PROCEDURE — 97140 MANUAL THERAPY 1/> REGIONS: CPT | Performed by: PHYSICAL THERAPIST

## 2021-05-18 PROCEDURE — 97110 THERAPEUTIC EXERCISES: CPT | Performed by: PHYSICAL THERAPIST

## 2021-05-18 NOTE — FLOWSHEET NOTE
5' Added 5/4   QL sidelying stretch 20-30 sec x 2 B Added 5/4   Piriformis stretch   Added 5/4   Prayer stretch Supine HS     Cat and camel     Lat stretch     Strengthening     Hip ABD SLR 10 x 3 2# ^5/18   Hip ext SLR 10 x 3 2# ^5/18   Multifidus activation Multifidus pulse 10 x 3 4# ^5/18   TA activation with hip/ knees at 90/90  2 x 5 B knee ext to ~45 deg ^5/18   Standing hip hike     Standing sidebend with kettlebell - bilateral 10 x 3 10# Added 5/18   Quadruped alternate UE/ LE reaches 3 x 10 B ^5/18   Walk-out standing anti-rotation press 2 x 10 blue TB  Added 5/13   Ball squats     Ball heel raises     Sit ups      planks     Tband lat pulls -    Tband rows     Side plank     Manual      Prone PA 2' lumbar spine - L3-5 Grade II-III, 5/18   Hypervolt     SI Mobilizations - on L 4' PA Grade III-IV, 5/18   Long axis distraction R SI 2' Grade III-IV 5/18   sidelying lumbo-pelvic roll mobs Hip belt mobs     IASTM To R paraspinals and L paraspinals and quadratus 8'  See below 5/18   Hip LA  Distraction             Therapeutic Exercise and NMR EXR  [x] (63910) Provided verbal/tactile cueing for activities related to strengthening, flexibility, endurance, ROM  for improvements in proximal hip and core control with self care, mobility, lifting and ambulation. [x] (87988) Provided verbal/tactile cueing for activities related to improving balance, coordination, kinesthetic sense, posture, motor skill, proprioception  to assist with core control in self care, mobility, lifting, and ambulation.      Therapeutic Activities:    [x] (05747 or 09461) Provided verbal/tactile cueing for activities related to improving balance, coordination, kinesthetic sense, posture, motor skill, proprioception and motor activation to allow for proper function  with self care and ADLs  [] (04251) Provided training and instruction to the patient for proper core and proximal hip recruitment and positioning with ambulation re-education     Home Exercise Program:    [x] (97709) Reviewed/Progressed HEP activities related to strengthening, flexibility, endurance, ROM of core, proximal hip and LE for functional self-care, mobility, lifting and ambulation   [] (58756) Reviewed/Progressed HEP activities related to improving balance, coordination, kinesthetic sense, posture, motor skill, proprioception of core, proximal hip and LE for self care, mobility, lifting, and ambulation      Access Code: [email or web address]  Last updated 5/13    Manual Treatments:  PROM / STM / Oscillations-Mobs:  G-I, II, III, IV (PA's, Inf., Post.)  [x] (97391) Provided manual therapy to mobilize proximal hip and LS spine soft tissue/joints for the purpose of modulating pain, promoting relaxation,  increasing ROM, reducing/eliminating soft tissue swelling/inflammation/restriction, improving soft tissue extensibility and allowing for proper ROM for normal function with self care, mobility, lifting and ambulation. Instrument Assisted Soft Tissue Mobilization (IASTM): was applied to the following muscles: B paraspinals and framing around lumbar spine and B SI joints with pt supine and to L quadratus lumborum with pt in prayer pose seated on heels with [employer] including multi tool and HG9 (tongue depressor). Treatment consisted of IASTM strokes including sweeping, fanning, brushing, strumming, filleting, pinning and framing, based on body region contours, nature of the soft tissue restriction and desired treatment outcomes. These techniques were used to restore neurophysiology, improve mechanotransduction, enhance fluid dynamics and break collagen crosslinks. The treatment area was exposed and the patient was draped in an appropriate manner. Upon completion the clinician cleaned the IASTM tools as per [facility] recommendations.    Skin check pre: normal  Skin check post: redness  Intermittent tx time: 8' 5/18      Modalities:     [] GAME READY (VASO)- for significant edema, swelling, pain control. Charges:  Timed Code Treatment Minutes: 39'    Total Treatment Minutes: 48'       [] EVAL (LOW) 80531 (typically 20 minutes face-to-face)  [] EVAL (MOD) 36299 (typically 30 minutes face-to-face)  [] EVAL (HIGH) 60885 (typically 45 minutes face-to-face)  [] RE-EVAL     [x] KG(45465) x   1  [] IONTO  [x] NMR (70455) x 1    [] VASO  [x] Manual (62105) x 1    [] Other:  [] TA x      [] Mech Traction (27108)  [] ES(attended) (80402)      [] ES (un) (93951):         ASSESSMENT:      Goals:   Patient stated goal: less pain, more flexibility, and strength, and get back into running     [] Progressing: [] Met: [] Not Met: [] Adjusted    Therapist goals for Patient:   Short Term Goals: To be achieved in: 2 weeks  1. Independent in HEP and progression per patient tolerance, in order to prevent re-injury. [] Progressing: [] Met: [] Not Met: [] Adjusted   2. Patient will have a decrease in pain to facilitate improvement in movement, function, and ADLs as indicated by Functional Deficits. [] Progressing: [] Met: [] Not Met: [] Adjusted    Long Term Goals: To be achieved in: 4-6 weeks  1. Disability index score of 12% or less for the modified oswestry to assist with reaching prior level of function. [] Progressing: [] Met: [] Not Met: [] Adjusted  2. Patient will demonstrate increased lumbar side bending and rotation WFL pain free to allow for proper joint functioning as indicated by patients Functional Deficits. [] Progressing: [] Met: [] Not Met: [] Adjusted  3. Patient will demonstrate an increase in hip (flex, ABD, and ext) strength to 4+/5 and improved multifidus strength to allow for proper functional mobility as indicated by patients Functional Deficits. [] Progressing: [] Met: [] Not Met: [] Adjusted  4. Patient will return to ADLs without increased symptoms or restriction. [] Progressing: [] Met: [] Not Met: [] Adjusted  5. Patient will report running pain free.     [] Progressing: [] Met: [] Not Met: [] Adjusted     Overall Progression Towards Functional goals/ Treatment Progress Update:  [] Patient is progressing as expected towards functional goals listed. [] Progression is slowed due to complexities/Impairments listed. [] Progression has been slowed due to co-morbidities. [x] Plan just implemented, too soon to assess goals progression <30days   [] Goals require adjustment due to lack of progress  [] Patient is not progressing as expected and requires additional follow up with physician  [] Other    Prognosis for POC: [x] Good [] Fair  [] Poor      Patient requires continued skilled intervention: [x] Yes  [] No      Treatment/Activity Tolerance:  [x] Patient tolerated treatment well [] Patient limited by fatique  [] Patient limited by pain  [] Patient limited by other medical complications  [x] Other: Pt narcisa session well. PT continues to note hypomobility with SI mobilizations. He continues to have tenderness with IASTM around R PSIS. He narcisa the addition of resisted side bends pain free. He also demonstrated improved core activation, noted by progression of TA bracing and quadruped ALT UE/ LE. He did, however, report pain when attempting to start TA brace hold with hips at 90 deg with knees straight, but tolerates 90/90 position. 5/18    Patient education: Patient education on PT and plan of care including diagnosis, prognosis, treatment goals and options. Patient agrees with discussed POC and treatment and is aware of rehab process. 4/28    PLAN: See eval 4/28  [x] Continue per plan of care [] Alter current plan (see comments above)  [] Plan of care initiated [] Hold pending MD visit [] Discharge      Electronically signed by:  Santiago Rader, PT, DPT       Note: If patient does not return for scheduled/ recommended follow up visits, this note will serve as a discharge from care along with most recent update on progress.

## 2021-05-20 ENCOUNTER — HOSPITAL ENCOUNTER (OUTPATIENT)
Dept: PHYSICAL THERAPY | Age: 66
Setting detail: THERAPIES SERIES
Discharge: HOME | End: 2021-05-20
Payer: MEDICARE

## 2021-05-20 PROCEDURE — 97110 THERAPEUTIC EXERCISES: CPT | Performed by: PHYSICAL THERAPIST

## 2021-05-20 PROCEDURE — 97140 MANUAL THERAPY 1/> REGIONS: CPT | Performed by: PHYSICAL THERAPIST

## 2021-05-20 PROCEDURE — 97112 NEUROMUSCULAR REEDUCATION: CPT | Performed by: PHYSICAL THERAPIST

## 2021-05-20 NOTE — FLOWSHEET NOTE
none    Exercises/Interventions:     ROM/stretches     Bike 5' Added 5/4   QL sidelying stretch 20-30 sec x 2 B Added 5/4   Piriformis stretch   Added 5/4   Prayer stretch 2 x 20 secs to R and LSeated on heels and reaching UEs to R and LSupine HS     Cat and camel     Lat stretch     Strengthening     Hip ABD SLR 10 x 3 2# ^5/18   Hip ext SLR 10 x 3 2# ^5/18   Multifidus activation Multifidus pulse 10 x 3 4# ^5/18   TA activation with hip/ knees at 90/90  2 x 10 B knee ext  ^5/20   Standing hip hike     Standing sidebend with kettlebell - bilateral 10 x 2 10# Modified 5/20   Quadruped alternate UE/ LE reaches 3 x 10 B ^5/18   Walk-out standing anti-rotation press  Added 5/13   Lateral Band Walks 2 x down and back  Added 5/20   SLS 3 x 30 sec SANJU Added 5/20 Needed hand for assistance    Ball squats     Ball heel raises     Sit ups      planks     Tband lat pulls     Tband rows     Side plank     Manual     Prone PA 4' lumbar spine - L3-5 Grade II-III, 5/18   Hypervolt 3' - L glutes and QL Added 5/20   SI Mobilizations - on L 4' PA Grade III-IV, 5/18   Long axis distraction R SI sidelying lumbo-pelvic roll mobs Hip belt mobs     IASTM To R paraspinals and L paraspinals and quadratus 8'  See below 5/18   Hip LA  Distraction           Therapeutic Exercise and NMR EXR  [x] (83639) Provided verbal/tactile cueing for activities related to strengthening, flexibility, endurance, ROM  for improvements in proximal hip and core control with self care, mobility, lifting and ambulation. [x] (99723) Provided verbal/tactile cueing for activities related to improving balance, coordination, kinesthetic sense, posture, motor skill, proprioception  to assist with core control in self care, mobility, lifting, and ambulation.      Therapeutic Activities:    [x] (59476 or 60754) Provided verbal/tactile cueing for activities related to improving balance, coordination, kinesthetic sense, posture, motor skill, proprioception and motor activation to allow for proper function  with self care and ADLs  [] (07711) Provided training and instruction to the patient for proper core and proximal hip recruitment and positioning with ambulation re-education     Home Exercise Program:    [x] (99950) Reviewed/Progressed HEP activities related to strengthening, flexibility, endurance, ROM of core, proximal hip and LE for functional self-care, mobility, lifting and ambulation   [] (55605) Reviewed/Progressed HEP activities related to improving balance, coordination, kinesthetic sense, posture, motor skill, proprioception of core, proximal hip and LE for self care, mobility, lifting, and ambulation      Access Code: [email or web address]  Last updated 5/13    Manual Treatments:  PROM / STM / Oscillations-Mobs:  G-I, II, III, IV (PA's, Inf., Post.)  [x] (21825) Provided manual therapy to mobilize proximal hip and LS spine soft tissue/joints for the purpose of modulating pain, promoting relaxation,  increasing ROM, reducing/eliminating soft tissue swelling/inflammation/restriction, improving soft tissue extensibility and allowing for proper ROM for normal function with self care, mobility, lifting and ambulation. Instrument Assisted Soft Tissue Mobilization (IASTM): was applied to the following muscles: B paraspinals and framing around lumbar spine and B SI joints with pt supine and to L quadratus lumborum with pt in prayer pose seated on heels with [employer] including multi tool and HG9 (tongue depressor). Treatment consisted of IASTM strokes including sweeping, fanning, brushing, strumming, filleting, pinning and framing, based on body region contours, nature of the soft tissue restriction and desired treatment outcomes. These techniques were used to restore neurophysiology, improve mechanotransduction, enhance fluid dynamics and break collagen crosslinks. The treatment area was exposed and the patient was draped in an appropriate manner.  Upon completion the Progressing: [] Met: [] Not Met: [] Adjusted  4. Patient will return to ADLs without increased symptoms or restriction. [] Progressing: [] Met: [] Not Met: [] Adjusted  5. Patient will report running pain free. [] Progressing: [] Met: [] Not Met: [] Adjusted     Overall Progression Towards Functional goals/ Treatment Progress Update:  [] Patient is progressing as expected towards functional goals listed. [] Progression is slowed due to complexities/Impairments listed. [] Progression has been slowed due to co-morbidities. [x] Plan just implemented, too soon to assess goals progression <30days   [] Goals require adjustment due to lack of progress  [] Patient is not progressing as expected and requires additional follow up with physician  [] Other    Prognosis for POC: [x] Good [] Fair  [] Poor      Patient requires continued skilled intervention: [x] Yes  [] No      Treatment/Activity Tolerance:  [x] Patient tolerated treatment well [] Patient limited by fatique  [] Patient limited by pain  [] Patient limited by other medical complications  [x] Other: Pt narcisa session well. Pt continues to have tenderness on L side of L1-L5. Pt is lacking balance on single limb exercise, noted by the need for assistance with his hand to stay stable. Pt was able to keep his L hip up in SLS, but needed constant VCs to prevent R hip drop. Once corrected, pt was able to keep hips aligned properly. Pt reported slight fatigue by the end of the session. 5/20    Patient education: Patient education on PT and plan of care including diagnosis, prognosis, treatment goals and options. Patient agrees with discussed POC and treatment and is aware of rehab process. 4/28    PLAN: See eval 4/28  [x] Continue per plan of care [] Alter current plan (see comments above)  [] Plan of care initiated [] Hold pending MD visit [] Discharge      Electronically signed by:  Lilly Johansen, student physical therapist  Therapist was present, directed the patient's care, made skilled judgement, and was responsible for assessment and treatment of the patient. Shira Noe, PT, DPT       Note: If patient does not return for scheduled/ recommended follow up visits, this note will serve as a discharge from care along with most recent update on progress.

## 2021-05-26 DIAGNOSIS — M43.16 SPONDYLOLISTHESIS OF LUMBAR REGION: ICD-10-CM

## 2021-05-26 DIAGNOSIS — M76.32 ILIOTIBIAL BAND SYNDROME OF LEFT SIDE: ICD-10-CM

## 2021-05-26 DIAGNOSIS — M76.31 ILIOTIBIAL BAND SYNDROME OF RIGHT SIDE: Primary | ICD-10-CM

## 2021-05-26 DIAGNOSIS — M54.50 LUMBAR PAIN: ICD-10-CM

## 2021-05-26 RX ORDER — METHYLPREDNISOLONE 4 MG/1
TABLET ORAL
Qty: 21 KIT | Refills: 0 | Status: SHIPPED | OUTPATIENT
Start: 2021-05-26 | End: 2021-07-26 | Stop reason: ALTCHOICE

## 2021-05-26 NOTE — TELEPHONE ENCOUNTER
SPOKE TO PATIENT AND CALLED IN A MEDROL DOSE PACK FOR PATIENT PER DR. Shivam Chung. PATIENT IS GOING ON A [place] TRIP THE WEEKEND OF June 4-6. IS CONCERNED ABOUT BACK FLARING ON HIM. I TOLD HIM TO SAVE STEROID UNLESS ABSOLUTELY NECESSARY AS HE JUST HAD ONE IN April. TOLD HIM HE COULD TAKE IT IF NEEDED, BUT TO TRY AND HOLD OFF. TOLD HIM IF HIS BACK CONTINUES TO GIVE HIM TROUBLE THAT WE WOULD WANT TO SEE HIM BACK IN OFFICE WHEN HE GETS BACK FROM [place].

## 2021-05-26 NOTE — TELEPHONE ENCOUNTER
Prescription Refill     Medication Name:  [address]: [facility]  Patient Contact Number: [phone]

## 2021-05-27 ENCOUNTER — HOSPITAL ENCOUNTER (OUTPATIENT)
Dept: PHYSICAL THERAPY | Age: 66
Setting detail: THERAPIES SERIES
Discharge: HOME | End: 2021-05-27
Payer: MEDICARE

## 2021-05-27 PROCEDURE — 97110 THERAPEUTIC EXERCISES: CPT | Performed by: PHYSICAL THERAPIST

## 2021-05-27 PROCEDURE — 97112 NEUROMUSCULAR REEDUCATION: CPT | Performed by: PHYSICAL THERAPIST

## 2021-05-27 PROCEDURE — 97530 THERAPEUTIC ACTIVITIES: CPT | Performed by: PHYSICAL THERAPIST

## 2021-05-27 NOTE — PLAN OF CARE
21    Total Visits: 8      Physical Therapy Treatment Note/ Progress Report:       Date:  2021    Patient Name:  Tia Mello    :  1955  MRN: [id number]  Restrictions/Precautions:    Medical/Treatment Diagnosis Information:  Diagnosis: Lumbar pain - M54.5, Spondylolisthesis of lumbar region - M43.16, Iliotibial band syndrome of right side -M76.31, Iliotibial band syndrome of left side - M76.32  Treatment Diagnosis: decreased strength, endurance, and high-level IADLs  Insurance/Certification information:  PT Insurance Information: [facility]  Physician Information:  Referring Practitioner: Dr. Kurtis Beatty  Has the plan of care been signed (Y/N):        [x]  Yes  []  No     Date of Patient follow up with Physician:     Is this a Progress Report:     [x]  Yes  []  No        Progress report/ Recertification will be due (10 Rx or 30 days whichever is less):  2021      Visit # Insurance Allowable Auth Required   8 BMN []  Yes []  No        Functional Scale: Mod oswestry  21 - 24% limitation  21 - 4% limitation            Latex Allergy:  [x]NO      []YES  Preferred Language for Healthcare:   [x]English       []other:      Pain level:  1/10 5/27     SUBJECTIVE: Pt states he is feeling the same as he did the last few sessions.  He states that his pain has not gotten worse than 1/10.      OBJECTIVE:    Observation:    Test measurements:       Standing Exam Normal Abnormal N/A Comments   Side bending Normal    WFL B    Pelvic Height Normal          Fwd Bend- (aberrant juttering or innominate mvmt)   Abnormal   Increased pain on R, WFL ROM   Extension Normal          Trendelenburg   Abnormal   L hip drop with R SLS   Stork           SLS/SLS with rotation Normal                                  Seated Exam Normal Abnormal N/A Comments   Pelvic Height Normal          Seated Rotation   Abnormal    Pain on R with L rot   Seated flexion  Normal       B hip IR Normal                                 Supine Exam Normal Abnormal N/A Comments   Hip flexion Normal     Pain no L    Abduction Normal          ER Normal          IR Normal         CHRISTINA/Trino Normal          FAIR Normal          Hip scour Normal          SLR Normal          Crossed SLR Normal          Supine to sit Normal          SI distraction/compression Normal          Thigh thrust Normal                      Prone Exam Normal Abnormal N/A Comments   Prone knee bend Normal          Prone hip IR Normal          PA/Spring   Abnormal   Hypomobile - L4-5 + min pain B    Prone Instability test           Sacral Spring/thrust Normal                                     ROM LEFT RIGHT Comments   Hip Flexion WFL WFL     Hip Abd [facility] WFL     Hip ER [facility] WFL     Hip IR     Hip Extension [facility] WFL                  Strength LEFT RIGHT Comments   Multifidus decreased activation Good activation  Improper sequencing   Transverse Ab       Hip Flexors 4+/5 4+/5     Hip Abductors 4/5 4/5     Hip Extensors 4-/5 4-/5                           Joint mobility:              []?Normal                      [x]? Hypo - lumbar spine              []?Hyper     Palpation: TTP SANJU L4-5     RESTRICTIONS/PRECAUTIONS: none    Exercises/Interventions:     ROM/stretches     Bike 5' Added 5/4   QL sidelying stretch 20-30 sec x 3 B ^ 5/27   Piriformis stretch   Added 5/4   Prayer stretch 2 x 20 secs to R and LSeated on heels and reaching UEs to R and LSupine HS     Cat and camel     Lat stretch     Strengthening     Hip ABD SLR 10 x 3 2#    Hip ext SLR 10 x 3 2#    Multifidus activation Multifidus pulse 10 x 3 4# ^5/18   TA activation with hip/ knees at 90/90  Standing hip hike     Standing sidebend with kettlebell - bilateral Quadruped alternate UE/ LE reaches Walk-out standing anti-rotation press  Added 5/13   Lateral Band Walks 3 x down and back green loop ^5/27   SLS 3 x 30 sec SANJU Added 5/20 Needed hand for assistance    SL Lateral Step Downs 2 x 10 SANJU Added 5/27 green step Ball squats     Ball heel raises     Sit ups      planks     Tband lat pulls     Tband rows     Side plank          Alter G 2' warm up- 30 sec jog, 30 sec walk for 5', 2' cool down @ 75% BW Added 5/27                       Manual     Prone PA Hypervolt SI Mobilizations - on L Long axis distraction R SI sidelying lumbo-pelvic roll mobs Hip belt mobs     IASTM     Hip LA  Distraction           Therapeutic Exercise and NMR EXR  [x] (36501) Provided verbal/tactile cueing for activities related to strengthening, flexibility, endurance, ROM  for improvements in proximal hip and core control with self care, mobility, lifting and ambulation. [x] (93853) Provided verbal/tactile cueing for activities related to improving balance, coordination, kinesthetic sense, posture, motor skill, proprioception  to assist with core control in self care, mobility, lifting, and ambulation.      Therapeutic Activities:    [x] (43072 or 40221) Provided verbal/tactile cueing for activities related to improving balance, coordination, kinesthetic sense, posture, motor skill, proprioception and motor activation to allow for proper function  with self care and ADLs  [] (12284) Provided training and instruction to the patient for proper core and proximal hip recruitment and positioning with ambulation re-education     Home Exercise Program:    [x] (19177) Reviewed/Progressed HEP activities related to strengthening, flexibility, endurance, ROM of core, proximal hip and LE for functional self-care, mobility, lifting and ambulation   [] (14421) Reviewed/Progressed HEP activities related to improving balance, coordination, kinesthetic sense, posture, motor skill, proprioception of core, proximal hip and LE for self care, mobility, lifting, and ambulation      Access Code: [email or web address]   Last updated 5/13    Manual Treatments:  PROM / STM / Oscillations-Mobs:  G-I, II, III, IV (PA's, Inf., Post.)  [] (76114) Provided manual therapy to mobilize proximal hip and LS spine soft tissue/joints for the purpose of modulating pain, promoting relaxation,  increasing ROM, reducing/eliminating soft tissue swelling/inflammation/restriction, improving soft tissue extensibility and allowing for proper ROM for normal function with self care, mobility, lifting and ambulation. Modalities:  Declined 5/27   [] GAME READY (VASO)- for significant edema, swelling, pain control. Charges:  Timed Code Treatment Minutes: 54'   Total Treatment Minutes: 76'      [] EVAL (LOW) 69509 (typically 20 minutes face-to-face)  [] EVAL (MOD) 76934 (typically 30 minutes face-to-face)  [] EVAL (HIGH) 62859 (typically 45 minutes face-to-face)  [] RE-EVAL     [x] XM(68803) x   1  [] IONTO  [x] NMR (38160) x 1    [] VASO  [] Manual (45040) x     [] Other:  [x] TA x  2    [] Mech Traction (82529)  [] ES(attended) (11163)      [] ES (un) (40597):      ASSESSMENT:      Goals:  5/27  Patient stated goal: less pain, more flexibility, and strength, and get back into running     [x] Progressing: [] Met: [] Not Met: [] Adjusted    Therapist goals for Patient:   Short Term Goals: To be achieved in: 2 weeks  1. Independent in HEP and progression per patient tolerance, in order to prevent re-injury. [] Progressing: [x] Met: [] Not Met: [] Adjusted   2. Patient will have a decrease in pain to facilitate improvement in movement, function, and ADLs as indicated by Functional Deficits. [] Progressing: [x] Met: [] Not Met: [] Adjusted    Long Term Goals: To be achieved in: 4-6 weeks  1. Disability index score of 12% or less for the modified oswestry to assist with reaching prior level of function. [] Progressing: [x] Met: [] Not Met: [] Adjusted  2. Patient will demonstrate increased lumbar side bending and rotation WFL pain free to allow for proper joint functioning as indicated by patients Functional Deficits. [x] Progressing: [] Met: [] Not Met: [] Adjusted  3.  Patient will demonstrate an increase in hip (flex, ABD, and ext) strength to 4+/5 and improved multifidus strength to allow for proper functional mobility as indicated by patients Functional Deficits. [x] Progressing: [] Met: [] Not Met: [] Adjusted  4. Patient will return to ADLs without increased symptoms or restriction. [] Progressing: [x] Met: [] Not Met: [] Adjusted  5. Patient will report running pain free. [x] Progressing: [] Met: [] Not Met: [] Adjusted     Overall Progression Towards Functional goals/ Treatment Progress Update:  [x] Patient is progressing as expected towards functional goals listed. [] Progression is slowed due to complexities/Impairments listed. [] Progression has been slowed due to co-morbidities. [] Plan just implemented, too soon to assess goals progression <30days   [] Goals require adjustment due to lack of progress  [] Patient is not progressing as expected and requires additional follow up with physician  [] Other    Prognosis for POC: [x] Good [] Fair  [] Poor      Patient requires continued skilled intervention: [x] Yes  [] No      Treatment/Activity Tolerance:  [x] Patient tolerated treatment well [] Patient limited by fatique  [] Patient limited by pain  [] Patient limited by other medical complications  [x] Other: Pt narcisa session well. Pt demonstrated improvements in spine mobility, pain free ROM and L hip abductor strength. Pt is lacking strength in SANJU hip extension strength and L multifidus activation, and experiences pain with L rotation and forward bending. Pt was able to narcisa running in [address] [place] [address] [address] at 75% BW without pain. Pt demonstrated SANJU hip drop in lateral step downs and was unable to correct with VCs. Pt needed CGA in all SL exercises. Recommend pt continue progressing with current POC to further improve hip extension strength, proper multifidus activation sequencing and increase ROM pain free in order to facilitate lifting, sitting, running, and golfing pain free.   5/27    Patient education: Patient education on PT and plan of care including diagnosis, prognosis, treatment goals and options. Patient agrees with discussed POC and treatment and is aware of rehab process. 4/28    PLAN: 1x per week for 4 weeks (5/27/21 - 6/24/21)  [x] Continue per plan of care [] Alter current plan (see comments above)  [] Plan of care initiated [] Hold pending MD visit [] Discharge      Electronically signed by: Doll Form, student physical therapist  Therapist was present, directed the patient's care, made skilled judgement, and was responsible for assessment and treatment of the patient. Shahzad Bains, PT, DPT     Note: If patient does not return for scheduled/ recommended follow up visits, this note will serve as a discharge from care along with most recent update on progress.

## 2021-07-26 ENCOUNTER — OFFICE VISIT (OUTPATIENT)
Dept: ORTHOPEDIC SURGERY | Age: 66
End: 2021-07-26
Payer: MEDICARE

## 2021-07-26 ENCOUNTER — TELEPHONE (OUTPATIENT)
Dept: ORTHOPEDIC SURGERY | Age: 66
End: 2021-07-26

## 2021-07-26 DIAGNOSIS — M76.31 ILIOTIBIAL BAND SYNDROME OF RIGHT SIDE: ICD-10-CM

## 2021-07-26 DIAGNOSIS — M43.16 SPONDYLOLISTHESIS OF LUMBAR REGION: ICD-10-CM

## 2021-07-26 DIAGNOSIS — M54.50 ACUTE MIDLINE LOW BACK PAIN WITHOUT SCIATICA: ICD-10-CM

## 2021-07-26 DIAGNOSIS — M47.816 LUMBAR SPONDYLOSIS: Primary | ICD-10-CM

## 2021-07-26 DIAGNOSIS — M76.32 ILIOTIBIAL BAND SYNDROME OF LEFT SIDE: ICD-10-CM

## 2021-07-26 PROCEDURE — 3017F COLORECTAL CA SCREEN DOC REV: CPT | Performed by: FAMILY MEDICINE

## 2021-07-26 PROCEDURE — G8427 DOCREV CUR MEDS BY ELIG CLIN: HCPCS | Performed by: FAMILY MEDICINE

## 2021-07-26 PROCEDURE — 1123F ACP DISCUSS/DSCN MKR DOCD: CPT | Performed by: FAMILY MEDICINE

## 2021-07-26 PROCEDURE — 1036F TOBACCO NON-USER: CPT | Performed by: FAMILY MEDICINE

## 2021-07-26 PROCEDURE — 99214 OFFICE O/P EST MOD 30 MIN: CPT | Performed by: FAMILY MEDICINE

## 2021-07-26 PROCEDURE — G8420 CALC BMI NORM PARAMETERS: HCPCS | Performed by: FAMILY MEDICINE

## 2021-07-26 PROCEDURE — 4040F PNEUMOC VAC/ADMIN/RCVD: CPT | Performed by: FAMILY MEDICINE

## 2021-07-26 NOTE — PROGRESS NOTES
Chief Complaint  Lower Back Pain (CK LUMBAR)      Follow-up ongoing lumbar pain with left to the right lateral hip pain and tightness of IT band    History of Present Illness:  Yani Hdez is a 77 y.o. 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 Kaylee Masterson with recently found elevated creatinine to 1.5 who is being seen today upon self-referral for evaluation of persistence of low back pain and lateral hip discomfort. He states that he has been having increasing discomfort in his back since doing an exercise class in January 2021. There is no history of actual injury or trauma although he is seen last in 2017 for mechanical back pain with IT band and was found to have lumbar spondylolisthesis at L4-5 at that point. He is having some soreness and achiness to the lateral aspect of his left greater than right leg but this seems to be more consistent with IT band and that it has true radiculopathy. He does not recall any specific history of trauma or injury during his exercise class a few months back in January 2021. He is having pain with positional changes as well as rotating such as swinging a golf club. He has over the last couple of weeks in particular had worsening pain with getting up from a seated position. He is not having any pain into the distal lower extremity below the knee. He is very tight at baseline and admits he has been very lax in performing his stretching program in his back but just started this a couple of weeks ago. Denies neurogenic bowel or bladder symptoms or high-grade night pain. He does have pain with lifting and rotating activities. He will have some discomfort with prolonged sitting such as driving. He is being seen today for orthopedic and sports consultation with updated imaging.     Tashia Lamar was last seen for his hips and back in the office on 4/27/2021 was started on rehabilitation for his back and hips once again. Once again he does have a history of very tight IT bands and hamstrings that initially treated him with Voltaren gel as well as rehabilitation. Following 8 sessions of PT he does seem to have improved with regard with hip symptoms but continues to have mechanical back pain symptoms. He was found on previous plain films to have an L4-5 spondylolisthesis and does have difficulty rotating such as swinging a golf club. He has been primarily doing water walking and has been episodically compliant with his home-based exercises. He was supposed to follow-up in May 2021 but comes back in today for evaluation. He has not been walking or doing excessive running but does continue to have pain with extension and feels stiff. It does alternate between an ache with occasional sharper pain with maximum of 3 to 4-10. There is a more irritating as opposed to highly limiting and he does not really seem to be complaining of a great deal of night pain or true radicular symptoms. Pain Assessment  Location of Pain: Back  Location Modifiers: Posterior  Severity of Pain: 1  Quality of Pain: Dull  Duration of Pain: A few hours  Frequency of Pain: Intermittent  Limiting Behavior: Some  Relieving Factors: Rest  Result of Injury: No  Work-Related Injury: No  Are there other pain locations you wish to document?: No       Medical History  Patient's medications, allergies, past medical, surgical, social and family histories were reviewed and updated as appropriate. Review of Systems  Pertinent items are noted in HPI  Review of systems reviewed from Patient History Form dated on 4/27/2021 and available in the patient's chart under the Media tab. Vital Signs  There were no vitals filed for this visit.     General Exam:     Constitutional: Patient is adequately groomed with no evidence of malnutrition  DTRs: Deep tendon reflexes are intact  Mental Status: The patient is oriented to time, place and person. The patient's mood and affect are appropriate. Lymphatic: The lymphatic examination bilaterally reveals all areas to be without enlargement or induration. Vascular: Examination reveals no swelling or calf tenderness. Peripheral pulses are palpable and 2+. Neurological: The patient has good coordination. There is no weakness or sensory deficit. Lumbar/Sacral Spine Examination  Inspection: There is no high-grade deformity or substantial soft tissue swelling. No evidence of palpable spasm. Palpation: Does have ongoing clinical tenderness along lumbar paraspinals as well as lower lumbar facets. No high-grade gluteal tenderness. There does not appear to be substantial trochanteric bursal tenderness but does have some residual tenderness over the proximal IT band left greater than right. Rang of Motion: He is able to forward flex to about 40-50. He does have some pain with extension but is fairly mild at 1-2 out of 10.  50% reduction in lateral bending rotation. Strength: There is not appear to be focal lower extremity motor deficits. Special Tests: Negative straight leg using bilaterally. He does have continued tightness to his IT bands with down negative bilateral Chucho's testing. Negative straight leg raising and logroll testing. Some pain with lumbar extension as noted above. Skin: There are no rashes, ulcerations or lesions. Distal motor sensory and vascular exams intact. Gait: Fluid smooth gait    Reflexes:  Symmetrically preserved     Additional Comments:     Additional Examinations:  Right Lower Extremity: Examination of the right lower extremity does not show any tenderness, deformity or injury. Range of motion is unremarkable. There is no gross instability. There are no rashes, ulcerations or lesions.   Strength and tone are normal.  Left Lower Extremity: Examination of the left lower extremity does not show any tenderness, deformity or injury. Range of motion is unremarkable. There is no gross instability. There are no rashes, ulcerations or lesions. Strength and tone are normal.  Neck: Examination of the neck does not show any tenderness, deformity or injury. Range of motion is unremarkable. There is no gross instability. There are no rashes, ulcerations or lesions. Strength and tone are normal.      Diagnostic Test Findings: AP and lateral lumbar spine films were reviewed from 4/27/2021 and does show lower facet arthropathy with a grade 1 spondylolisthesis L4-5. Lower lumbar degenerative disc changes noted. Assessment: #1.  6 months status post recurrent symptomatic mechanical low back pain with underlying lumbar L4-5 spondylolisthesis with bilateral hip pain with IT band with inflexibility    Impression:  Encounter Diagnoses   Name Primary?  Lumbar spondylosis Yes    Acute midline low back pain without sciatica     Iliotibial band syndrome of right side     Iliotibial band syndrome of left side     Spondylolisthesis of lumbar region        Office Procedures:  Orders Placed This Encounter   Procedures    MRI LUMBAR SPINE WO CONTRAST     Standing Status:   Future     Standing Expiration Date:   8/26/2021     Scheduling Instructions:      [facility] [place] [address]      [phone]            AUTH #:      TIME AND DATE TBD      PLEASE CALL PATIENT ONCE APPROVED TO SCHEDULE       PUSH TO [employer] [facility]            Remember that it may take several business days to pre-cert your MRI through your insurance. Our office will contact you as soon as we have the approval. We will not give any test results over the phone. Please call [phone] once you have your test day and time to schedule a follow up with Dr. Janet Short.      Order Specific Question:   Reason for exam:     Answer:   EVALUATE SPINAL STENOSIS, SPONDYLOSIS, FACET DJD       Treatment Plan:  Treatment options were discussed with Candice Griffith. We did once again review her plain films and exam findings. He has known to have underlying lumbar degenerative disc changes with an L4-5 spondylolisthesis. He does not seem to be complaining of definitive radicular symptoms and I suspect the majority of his pain is related to his underlying lumbar degenerative changes with listhesis although he does have chronic tightness to his IT bands as well. He states his IT band symptoms have improved and it does not really seem to be complaining of high-grade radicular symptoms although he has had this in the past.  With his persistent irritating symptoms, we did set him up for an MRI of his lumbar spine. I would like for him to continue with his Voltaren gel 4 g 4 times daily as he did previously have an elevation in his creatinine to 1.5. He continue with his home-based exercise program as well as his water walking. We held off on additional therapy pending the results of his MRI. He is not having a great deal of hip pain. Icing and activity modification was discussed. We will follow him up on the [place] post imaging. He will contact us in the interim with questions or concerns. This dictation was performed with a verbal recognition program (DRAGON) and it was checked for errors. It is possible that there are still dictated errors within this office note. If so, please bring any errors to my attention for an addendum. All efforts were made to ensure that this office note is accurate.

## 2021-08-04 ENCOUNTER — OFFICE VISIT (OUTPATIENT)
Dept: ORTHOPEDIC SURGERY | Age: 66
End: 2021-08-04
Payer: MEDICARE

## 2021-08-04 DIAGNOSIS — M43.16 SPONDYLOLISTHESIS OF LUMBAR REGION: Primary | ICD-10-CM

## 2021-08-04 DIAGNOSIS — M48.061 SPINAL STENOSIS OF LUMBAR REGION, UNSPECIFIED WHETHER NEUROGENIC CLAUDICATION PRESENT: ICD-10-CM

## 2021-08-04 DIAGNOSIS — M54.50 ACUTE MIDLINE LOW BACK PAIN WITHOUT SCIATICA: ICD-10-CM

## 2021-08-04 PROCEDURE — G8420 CALC BMI NORM PARAMETERS: HCPCS | Performed by: FAMILY MEDICINE

## 2021-08-04 PROCEDURE — 99213 OFFICE O/P EST LOW 20 MIN: CPT | Performed by: FAMILY MEDICINE

## 2021-08-04 PROCEDURE — 4040F PNEUMOC VAC/ADMIN/RCVD: CPT | Performed by: FAMILY MEDICINE

## 2021-08-04 PROCEDURE — 3017F COLORECTAL CA SCREEN DOC REV: CPT | Performed by: FAMILY MEDICINE

## 2021-08-04 PROCEDURE — 1123F ACP DISCUSS/DSCN MKR DOCD: CPT | Performed by: FAMILY MEDICINE

## 2021-08-04 PROCEDURE — 1036F TOBACCO NON-USER: CPT | Performed by: FAMILY MEDICINE

## 2021-08-04 PROCEDURE — G8427 DOCREV CUR MEDS BY ELIG CLIN: HCPCS | Performed by: FAMILY MEDICINE

## 2021-08-04 RX ORDER — METHYLPREDNISOLONE 4 MG/1
TABLET ORAL
Qty: 21 KIT | Refills: 0 | Status: SHIPPED | OUTPATIENT
Start: 2021-08-04 | End: 2021-09-07

## 2021-08-04 NOTE — PROGRESS NOTES
Chief Complaint  Lower Back Pain (TR MRI LUMBAR)      Follow-up ongoing lumbar pain with left to the right lateral hip pain and tightness of IT band    History of Present Illness:  Sea Luther is a 77 y.o. male who is a very pleasant former runner who does primarily walking at this point and is a recreational golfer who is a retired  from [employer] and a very nice patient of Dr. Ana Rosa Johnson with recently found elevated creatinine to 1.5 who is being seen today upon self-referral for evaluation of persistence of low back pain and lateral hip discomfort. He states that he has been having increasing discomfort in his back since doing an exercise class in January 2021. There is no history of actual injury or trauma although he is seen last in 2017 for mechanical back pain with IT band and was found to have lumbar spondylolisthesis at L4-5 at that point. He is having some soreness and achiness to the lateral aspect of his left greater than right leg but this seems to be more consistent with IT band and that it has true radiculopathy. He does not recall any specific history of trauma or injury during his exercise class a few months back in January 2021. He is having pain with positional changes as well as rotating such as swinging a golf club. He has over the last couple of weeks in particular had worsening pain with getting up from a seated position. He is not having any pain into the distal lower extremity below the knee. He is very tight at baseline and admits he has been very lax in performing his stretching program in his back but just started this a couple of weeks ago. Denies neurogenic bowel or bladder symptoms or high-grade night pain. He does have pain with lifting and rotating activities. He will have some discomfort with prolonged sitting such as driving. He is being seen today for orthopedic and sports consultation with updated imaging.     Kristin Mcleods was last seen for his hips and back in the office on using his Voltaren gel and working on a stretching and exercise program.  His ultimate goal is to get back into running and golf. He does deny neurogenic bowel or bladder symptoms. He does recall having what sounds to be an epidural with excellent relief of his pain he believes back in 2010. Medical History  Patient's medications, allergies, past medical, surgical, social and family histories were reviewed and updated as appropriate. Review of Systems  Pertinent items are noted in HPI  Review of systems reviewed from Patient History Form dated on 4/27/2021 and available in the patient's chart under the Media tab. Vital Signs  There were no vitals filed for this visit. General Exam:     Constitutional: Patient is adequately groomed with no evidence of malnutrition  DTRs: Deep tendon reflexes are intact  Mental Status: The patient is oriented to time, place and person. The patient's mood and affect are appropriate. Lymphatic: The lymphatic examination bilaterally reveals all areas to be without enlargement or induration. Vascular: Examination reveals no swelling or calf tenderness. Peripheral pulses are palpable and 2+. Neurological: The patient has good coordination. There is no weakness or sensory deficit. Lumbar/Sacral Spine Examination  Inspection: There is no high-grade deformity or substantial soft tissue swelling. No evidence of palpable spasm. Palpation: Does have ongoing clinical tenderness along lumbar paraspinals as well as lower lumbar facets. No high-grade gluteal tenderness. There does not appear to be substantial trochanteric bursal tenderness but does have some residual tenderness over the proximal IT band left greater than right. Rang of Motion: He is able to forward flex to about 40-50. He does have some pain with extension but is fairly mild at 1-2 out of 10.  50% reduction in lateral bending rotation. Strength:  There is not appear to be focal lower extremity motor deficits. Special Tests: Negative straight leg using bilaterally. He does have continued tightness to his IT bands with down negative bilateral Chucho's testing. Negative straight leg raising and logroll testing. Some pain with lumbar extension as noted above. Skin: There are no rashes, ulcerations or lesions. Distal motor sensory and vascular exams intact. Gait: Fluid smooth gait    Reflexes:  Symmetrically preserved     Additional Comments:     Additional Examinations:  Right Lower Extremity: Examination of the right lower extremity does not show any tenderness, deformity or injury. Range of motion is unremarkable. There is no gross instability. There are no rashes, ulcerations or lesions. Strength and tone are normal.  Left Lower Extremity: Examination of the left lower extremity does not show any tenderness, deformity or injury. Range of motion is unremarkable. There is no gross instability. There are no rashes, ulcerations or lesions. Strength and tone are normal.  Neck: Examination of the neck does not show any tenderness, deformity or injury. Range of motion is unremarkable. There is no gross instability. There are no rashes, ulcerations or lesions. Strength and tone are normal.      Diagnostic Test Findings: Lumbar spine MRI obtained [facility] 2021 is listed above. Narrative   Site: [facility] Rad #: [id number] #: [id number] Procedure: MR Lumbar Spine w/o Contrast ; Reason for Exam: evaluate spinal stenosis, spondylosis, facet DJD   This document is confidential medical information.  Unauthorized disclosure or use of this information is prohibited by law. If you are not the intended recipient of this document, please advise us by calling immediately [phone].       [facility], [address] [place] [address]           Patient Name: Rajat Wong   Case ID: [id number]   Patient : 1955   Referring Physician: Karen Garduno his back brace. We will see him back as needed. He will continue with his IT band and hip stretches as well. He will contact us in the interim with questions or concerns. .      This dictation was performed with a verbal recognition program (DRAGON) and it was checked for errors. It is possible that there are still dictated errors within this office note. If so, please bring any errors to my attention for an addendum. All efforts were made to ensure that this office note is accurate.

## 2021-08-23 ENCOUNTER — HOSPITAL ENCOUNTER (OUTPATIENT)
Dept: GENERAL RADIOLOGY | Age: 66
Discharge: HOME | End: 2021-08-23
Payer: MEDICARE

## 2021-08-23 ENCOUNTER — HOSPITAL ENCOUNTER (OUTPATIENT)
Age: 66
Discharge: HOME | End: 2021-08-23
Payer: MEDICARE

## 2021-08-23 DIAGNOSIS — M25.511 PAIN IN JOINT OF RIGHT SHOULDER: ICD-10-CM

## 2021-08-23 DIAGNOSIS — M25.512 PAIN IN JOINT OF LEFT SHOULDER: ICD-10-CM

## 2021-08-23 DIAGNOSIS — M25.551 RIGHT HIP PAIN: ICD-10-CM

## 2021-08-23 PROCEDURE — 73030 X-RAY EXAM OF SHOULDER: CPT

## 2021-08-23 PROCEDURE — 73502 X-RAY EXAM HIP UNI 2-3 VIEWS: CPT

## 2021-09-07 NOTE — PROGRESS NOTES
[facility] time_____9/10/21 0830_______        Surgery time_____0930_______    Take the following medications with a sip of water:    Do not eat or drink anything after 12:00 midnight prior to your surgery. This includes water chewing gum, mints and ice chips. You may brush your teeth and gargle the morning of your surgery, but do not swallow the water     Please see your family doctor/pediatrician for a history and physical and/or concerning medications. Bring any test results/reports from your physicians office. If you are under the care of a heart doctor or specialist doctor, please be aware that you may be asked to them for clearance    You may be asked to stop blood thinners such as Coumadin, Plavix, Fragmin, Lovenox, etc., or any anti-inflammatories such as:  Aspirin, Ibuprofen, Advil, Naproxen prior to your surgery. We also ask that you stop any OTC medications such as fish oil, vitamin E, glucosamine, garlic, Multivitamins, COQ 10, etc.    We ask that you do not smoke 24 hours prior to surgery  We ask that you do not  drink any alcoholic beverages 24 hours prior to surgery     You must make arrangements for a responsible adult to take you home after your surgery. For your safety you will not be allowed to leave alone or drive yourself home. Your surgery will be cancelled if you do not have a ride home. Also for your safety, it is strongly suggested that someone stay with you the first 24 hours after your surgery. A parent or legal guardian must accompany a child scheduled for surgery and plan to stay at the hospital until the child is discharged. Please do not bring other children with you. For your comfort, please wear simple loose fitting clothing to the hospital.  Please do not bring valuables.     Do not wear any make-up or nail polish on your fingers or toes      For your safety, please do not wear any jewelry or body piercing's on the day of surgery. All jewelry must be removed. If you have dentures, they will be removed before going to operating room. For your convenience, we will provide you with a container. If you wear contact lenses or glasses, they will be removed, please bring a case for them. If you have a living will and a durable power of  for healthcare, please bring in a copy. As part of our patient safety program to minimize surgical site infections, we ask you to do the following:    · Please notify your surgeon if you develop any illness between         now and the  day of your surgery. · This includes a cough, cold, fever, sore throat, nausea,         or vomiting, and diarrhea, etc.  ·  Please notify your surgeon if you experience dizziness, shortness         of breath or blurred vision between now and the time of your surgery. Do not shave your operative site 96 hours prior to surgery. For face and neck surgery, men may use an electric razor 48 hours   prior to surgery. You may shower the night before surgery or the morning of   your surgery with an antibacterial soap. You will need to bring a photo ID and insurance card    [facility] has an onsite pharmacy, would you like to utilize our pharmacy     If you will be staying overnight and use a C-pap machine, please bring   your C-pap to hospital     Our goal is to provide you with excellent care, therefore, visitors will be limited to two(2) in the room at a time so that we may focus on providing this care for you. Please contact pre-admission testing if you have any further questions. [facility] phone number:  [phone]  Please note these are generalized instructions for all surgical cases, you may be provided with more specific instructions according to your surgery.

## 2021-09-10 ENCOUNTER — HOSPITAL ENCOUNTER (OUTPATIENT)
Age: 66
Setting detail: OUTPATIENT SURGERY
Discharge: HOME | End: 2021-09-10
Attending: ANESTHESIOLOGY | Admitting: ANESTHESIOLOGY
Payer: MEDICARE

## 2021-09-10 ENCOUNTER — APPOINTMENT (OUTPATIENT)
Dept: INTERVENTIONAL RADIOLOGY/VASCULAR | Age: 66
End: 2021-09-10
Attending: ANESTHESIOLOGY
Payer: MEDICARE

## 2021-09-10 VITALS
DIASTOLIC BLOOD PRESSURE: 92 MMHG | HEART RATE: 48 BPM | BODY MASS INDEX: 23.79 KG/M2 | HEIGHT: 68 IN | SYSTOLIC BLOOD PRESSURE: 159 MMHG | OXYGEN SATURATION: 99 % | WEIGHT: 157 LBS | TEMPERATURE: 96.9 F | RESPIRATION RATE: 16 BRPM

## 2021-09-10 PROCEDURE — 3610000054 HC PAIN LEVEL 3 BASE (NON-OR): Performed by: ANESTHESIOLOGY

## 2021-09-10 PROCEDURE — 2709999900 HC NON-CHARGEABLE SUPPLY: Performed by: ANESTHESIOLOGY

## 2021-09-10 PROCEDURE — 6360000004 HC RX CONTRAST MEDICATION: Performed by: ANESTHESIOLOGY

## 2021-09-10 PROCEDURE — 6360000002 HC RX W HCPCS: Performed by: ANESTHESIOLOGY

## 2021-09-10 RX ORDER — METHYLPREDNISOLONE ACETATE 80 MG/ML
INJECTION, SUSPENSION INTRA-ARTICULAR; INTRALESIONAL; INTRAMUSCULAR; SOFT TISSUE
Status: COMPLETED | OUTPATIENT
Start: 2021-09-10 | End: 2021-09-10

## 2021-09-10 ASSESSMENT — PAIN SCALES - GENERAL
PAINLEVEL_OUTOF10: 0
PAINLEVEL_OUTOF10: 0

## 2021-09-10 ASSESSMENT — PAIN - FUNCTIONAL ASSESSMENT: PAIN_FUNCTIONAL_ASSESSMENT: 0-10

## 2021-09-10 NOTE — H&P
Patient:  Evangelista Castro  YOB: 1955  Medical Record #:  [id number]   Place: [address] [place] [address]  Date:  9/10/2021   Physician:  Ang Saunders MD    History Obtained From: electronic medical record    HISTORY OF PRESENT ILLNESS    Past Medical History:        Diagnosis Date    Bicuspid aortic valve 2013    CKD (chronic kidney disease) 2013    stg 3     Diverticulitis     HIGH CHOLESTEROL     Hypertension     Prostate CA [facility]) 2018    Thoracic ascending aortic aneurysm ([address] 2017     Past Surgical History:        Procedure Laterality Date    CARDIAC SURGERY      HERNIA REPAIR      inguinal     Medications Prior to Admission:   No current facility-administered medications on file prior to encounter. Current Outpatient Medications on File Prior to Encounter   Medication Sig Dispense Refill    diclofenac sodium (VOLTAREN) 1 % GEL Apply 4 g topically 4 times daily as needed for Pain 100 g 3    traZODone (DESYREL) 50 MG tablet Take 50 mg by mouth nightly      diclofenac sodium (VOLTAREN) 1 % GEL Apply 4 g topically 4 times daily as needed for Pain 100 g 3    pantoprazole (PROTONIX) 20 MG tablet Take 40 mg by mouth      metoprolol succinate (TOPROL XL) 25 MG extended release tablet Take 25 mg by mouth daily      losartan (COZAAR) 100 MG tablet Take 100 mg by mouth daily.  hydrALAZINE (APRESOLINE) 25 MG tablet Take 25 mg by mouth 3 times daily.  amLODIPine (NORVASC) 5 MG tablet Take 2.5 mg by mouth daily       Atorvastatin Calcium (LIPITOR PO) Take 20 mg by mouth every other day.  aspirin 81 MG EC tablet Take 81 mg by mouth daily. Allergies:  Patient has no known allergies.   Social History     Socioeconomic History    Marital status:      Spouse name: Not on file    Number of children: Not on file    Years of education: Not on file    Highest education level: Not on file   Occupational History    Not on file   Tobacco Use    Smoking status:

## 2022-02-08 DIAGNOSIS — M54.50 ACUTE MIDLINE LOW BACK PAIN WITHOUT SCIATICA: ICD-10-CM

## 2022-02-08 DIAGNOSIS — M54.50 LUMBAR PAIN: ICD-10-CM

## 2022-02-08 DIAGNOSIS — M48.061 SPINAL STENOSIS OF LUMBAR REGION, UNSPECIFIED WHETHER NEUROGENIC CLAUDICATION PRESENT: ICD-10-CM

## 2022-02-08 DIAGNOSIS — M76.31 ILIOTIBIAL BAND SYNDROME OF RIGHT SIDE: ICD-10-CM

## 2022-02-08 DIAGNOSIS — M43.16 SPONDYLOLISTHESIS OF LUMBAR REGION: Primary | ICD-10-CM

## 2022-02-08 DIAGNOSIS — M76.32 ILIOTIBIAL BAND SYNDROME OF LEFT SIDE: ICD-10-CM

## 2022-02-08 DIAGNOSIS — M47.816 LUMBAR SPONDYLOSIS: ICD-10-CM

## 2022-02-08 NOTE — TELEPHONE ENCOUNTER
Prescription Refill     Medication Name:  VOLTAREN GEL  Pharmacy: [place] [id number] [facility], [address]  Patient Contact Number:  [phone]    PATIENT HAS  RX. NEEDING NEW RX.  PLEASE CONTACT PHARMACY IF OK TO FILL

## 2022-02-08 NOTE — TELEPHONE ENCOUNTER
CALLED PATIENT TO INFORM THEM THAT THE RX HAS BEEN SENT OVER  [address] IN [facility]

## 2023-02-16 ENCOUNTER — TELEPHONE (OUTPATIENT)
Dept: ORTHOPEDIC SURGERY | Age: 68
End: 2023-02-16

## 2023-02-16 NOTE — TELEPHONE ENCOUNTER
SPOKE TO PATIENT AND OFFERED HIM SOME DATES/TIMES. SCHEDULED FOR NEXT WEEK DUE TO DR. EVANGELISTA BEING OUT FOR CME EVENT.

## 2023-02-16 NOTE — TELEPHONE ENCOUNTER
Appointment Request     Patient requesting earlier appointment: Yes  Appointment offered to patient: 02/21/2023  Patient Contact Number: [phone]      PATIENT IS REQUESTING TO BE WORKED IN FOR A FOLLOW TO BACK PAIN AND NEW ORTHOTICS

## 2023-02-21 ENCOUNTER — OFFICE VISIT (OUTPATIENT)
Dept: ORTHOPEDIC SURGERY | Age: 68
End: 2023-02-21

## 2023-02-21 ENCOUNTER — TELEPHONE (OUTPATIENT)
Dept: ORTHOPEDIC SURGERY | Age: 68
End: 2023-02-21

## 2023-02-21 VITALS — HEIGHT: 68 IN | BODY MASS INDEX: 22.43 KG/M2 | WEIGHT: 148 LBS

## 2023-02-21 DIAGNOSIS — M54.50 LUMBAR PAIN: ICD-10-CM

## 2023-02-21 DIAGNOSIS — M43.16 SPONDYLOLISTHESIS OF LUMBAR REGION: Primary | ICD-10-CM

## 2023-02-21 DIAGNOSIS — M48.061 SPINAL STENOSIS OF LUMBAR REGION, UNSPECIFIED WHETHER NEUROGENIC CLAUDICATION PRESENT: ICD-10-CM

## 2023-02-21 RX ORDER — TRAMADOL HYDROCHLORIDE 50 MG/1
50 TABLET ORAL EVERY 6 HOURS PRN
Qty: 28 TABLET | Refills: 0 | Status: SHIPPED | OUTPATIENT
Start: 2023-02-21 | End: 2023-02-28

## 2023-02-21 RX ORDER — TIZANIDINE 4 MG/1
TABLET ORAL
Qty: 60 TABLET | Refills: 0 | Status: SHIPPED | OUTPATIENT
Start: 2023-02-21

## 2023-02-21 RX ORDER — METHYLPREDNISOLONE 4 MG/1
TABLET ORAL
Qty: 21 KIT | Refills: 0 | Status: SHIPPED | OUTPATIENT
Start: 2023-02-21

## 2023-02-21 NOTE — PROGRESS NOTES
Chief Complaint  Lower Back Pain (Low Back Pain)      Initial evaluation recurrent severe mechanical lumbar pain with left to the right lateral hip pain and tightness of IT band with known history of lumbar spinal listhesis with multilevel disc protrusions established previously with Dr. Odilia Syed    History of Present Illness:  Gil De Jesus is a 69 y.o. 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 Caro Guerra with recently found elevated creatinine to 1.5 who is being seen today upon self-referral for evaluation of persistence of low back pain and lateral hip discomfort. He states that he has been having increasing discomfort in his back since doing an exercise class in January 2021. There is no history of actual injury or trauma although he is seen last in 2017 for mechanical back pain with IT band and was found to have lumbar spondylolisthesis at L4-5 at that point. He is having some soreness and achiness to the lateral aspect of his left greater than right leg but this seems to be more consistent with IT band and that it has true radiculopathy. He does not recall any specific history of trauma or injury during his exercise class a few months back in January 2021. He is having pain with positional changes as well as rotating such as swinging a golf club. He has over the last couple of weeks in particular had worsening pain with getting up from a seated position. He is not having any pain into the distal lower extremity below the knee. He is very tight at baseline and admits he has been very lax in performing his stretching program in his back but just started this a couple of weeks ago. Denies neurogenic bowel or bladder symptoms or high-grade night pain. He does have pain with lifting and rotating activities.   He will have some discomfort with prolonged sitting such as driving. He is being seen today for orthopedic and sports consultation with updated imaging. Lindy Murphy was last seen for his hips and back in the office on 4/27/2021 was started on rehabilitation for his back and hips once again. Once again he does have a history of very tight IT bands and hamstrings that initially treated him with Voltaren gel as well as rehabilitation. Following 8 sessions of PT he does seem to have improved with regard with hip symptoms but continues to have mechanical back pain symptoms. He was found on previous plain films to have an L4-5 spondylolisthesis and does have difficulty rotating such as swinging a golf club. He has been primarily doing water walking and has been episodically compliant with his home-based exercises. He was supposed to follow-up in May 2021 but comes back in today for evaluation. He has not been walking or doing excessive running but does continue to have pain with extension and feels stiff. It does alternate between an ache with occasional sharper pain with maximum of 3 to 4-10. There is a more irritating as opposed to highly limiting and he does not really seem to be complaining of a great deal of night pain or true radicular symptoms. Lindy Murphy was last seen in the office on 7/26/2021 after a several month hiatus for follow-up on his persistent mechanical back pain with hip pain and suspected IT band with chronic inflexibility. We did set him up for an MRI of his lumbar spine which was performed at [facility] on 8/2/2021. This did show evidence of severe L4-5 and more moderate L3-4 central canal stenosis due to disc protrusion and facet arthropathy. He does have more of a central disc protrusion at L2-3 and a left-sided protrusion at L1 to and a central protrusion at L5-S1 without high-grade foraminal narrowing. He did have a 4 mm anterolisthesis of L4-5 without evidence of acute osseous injury.   Clinically he does have some soreness and does complain of some symptoms consistent with spinal stenosis but has been able to walk. His overall intensity of the pain is still only about a 2-3 out of 10. He has been using his Voltaren gel and working on a stretching and exercise program.  His ultimate goal is to get back into running and golf. He does deny neurogenic bowel or bladder symptoms. He does recall having what sounds to be an epidural with excellent relief of his pain he believes back in 2010. We last saw Edelmira Jeffrey for his back in the office a couple of years ago in August 2021 for his mechanical back pain and has received most of his lumbar treatments with Dr. Chris Dowling since that time. He has had 3 rounds of epidural injections the first being 9/10/2021 with subsequent injections on 2/2/2022 with his last injection on 4/5/2022 and has been reasonably tolerable with regard to his mechanical back pain. He is now only walk he is not running but does try to exercise and has been reasonably good about keeping up with his core stabilization exercise program.  He states that last week on roughly 2/16/2022 he did develop some soreness into his back but there is no history of injury fall or trauma this did progressively worsen throughout the weekend to the point where he was on 2/18/2022 experiencing excruciating back pain with spasm. Its mostly in the axial spine with some soreness to the gluteal regions but does not really seem to be complaining of high-grade radicular symptoms or neurogenic bowel or bladder symptoms. He is having more rest pain at 5-6 out of 10 with very sharp catching pain at 8-9 out of 10 with positional changes or if he attempts to rotate. He has been trying to take Tylenol as he has a history of elevated creatinine. He is supposed to be leaving for [place] for 2 weeks the weekend of 3/4/2022 and contacted us today for urgent consultation given the severity of his pain.           Medical History  Patient's medications, allergies, past medical, surgical, social and family histories were reviewed and updated as appropriate. Review of Systems  Pertinent items are noted in HPI  Review of systems reviewed from Patient History Form dated on 4/27/2021 and available in the patient's chart under the Media tab. Vital Signs  There were no vitals filed for this visit. General Exam:     Constitutional: Patient is adequately groomed with no evidence of malnutrition  DTRs: Deep tendon reflexes are intact  Mental Status: The patient is oriented to time, place and person. The patient's mood and affect are appropriate. Lymphatic: The lymphatic examination bilaterally reveals all areas to be without enlargement or induration. Vascular: Examination reveals no swelling or calf tenderness. Peripheral pulses are palpable and 2+. Neurological: The patient has good coordination. There is no weakness or sensory deficit. Lumbar/Sacral Spine Examination  Inspection: There is no high-grade deformity or substantial soft tissue swelling. No evidence of palpable spasm. Palpation: Does have recurrent significant tenderness along lumbar paraspinals as well as lower lumbar facets. No high-grade gluteal tenderness only mild soreness. There does not appear to be substantial trochanteric bursal tenderness but does have some residual tenderness over the proximal IT band left greater than right. Rang of Motion: He is very stiff and has pain with positional changes can only forward flex to about 30 degrees. He does significant pain with extension at 7 out of 10.  50% reduction in lateral bending rotation. Strength: There is not appear to be focal lower extremity motor deficits. Special Tests: Negative straight leg using bilaterally. He does have continued tightness to his IT bands with down negative bilateral Chucho's testing. Negative straight leg raising and logroll testing. Worsening of pain with lumbar extension as noted above.      Skin: There are no rashes, ulcerations or lesions. Distal motor sensory and vascular exams intact. Gait: Moderate antalgia and pain with positional change. Reflexes:  Symmetrically preserved     Additional Comments:     Additional Examinations:  Right Lower Extremity: Examination of the right lower extremity does not show any tenderness, deformity or injury. Range of motion is unremarkable. There is no gross instability. There are no rashes, ulcerations or lesions. Strength and tone are normal.  Left Lower Extremity: Examination of the left lower extremity does not show any tenderness, deformity or injury. Range of motion is unremarkable. There is no gross instability. There are no rashes, ulcerations or lesions. Strength and tone are normal.  Neck: Examination of the neck does not show any tenderness, deformity or injury. Range of motion is unremarkable. There is no gross instability. There are no rashes, ulcerations or lesions. Strength and tone are normal.      Diagnostic Test Findings: Lumbar spine MRI obtained [facility] 2021 is listed above. Narrative   Site: [facility] #: [id number] #: [id number] Procedure: MR Lumbar Spine w/o Contrast ; Reason for Exam: evaluate spinal stenosis, spondylosis, facet DJD   This document is confidential medical information. Unauthorized disclosure or use of this information is prohibited by law. If you are not the intended recipient of this document, please advise us by calling immediately [phone]. "[facility] - [address]           Patient Name: Praveen Fields   Case ID: [id number]   Patient : 1955   Referring Physician: Rock Noemi MD   Exam Date: 2021   Exam Description: MR Lumbar Spine w/o Contrast            HISTORY:   Spinal stenosis. TECHNICAL FACTORS:  Long- and short-axis fat- and water-weighted images were performed.        COMPARISON:  Prior examination from  2010.       FINDINGS:    The L5-S1 level shows a central protrusion type disc herniation indenting the anterior aspect    of thecal sac. Facet arthropathy. The neural foramina patent. The L4-5 level shows severe central spinal stenosis secondary to concentric bulging disc,    ligamentous hypertrophy and facet arthropathy. 4mm of anterolisthesis of L4 on L5. No    spondylolysis. The neural foramina patent. The L3-4 level shows moderate central spinal stenosis secondary to concentric bulging disc,    ligamentous hypertrophy and facet arthropathy. The neural foramina patent. The L2-3 level shows a central protrusion type disc herniation indenting the anterior thecal    sac. The neural foramina patent. The L1-2 level shows a left-sided protrusion type disc herniation indenting the anterior thecal    sac. The neural foramina patent. The T12-L1 level shows no evidence of disc herniation or spinal stenosis. The neural foramina    patent. T11-12 level shows concentric bulging disc without focal disc herniation. The neural foramina    patent. Conus and cauda equina are normal.       Paravertebral soft tissues are normal.       No fracture dislocation identified. Progression since the prior examination. CONCLUSION:   1. Severe L4-5 and moderate L3-4 central spinal stenosis secondary to concentric bulging disc,    ligamentous hypertrophy and facet arthropathy. 2. Central protrusion type L2-3 disc herniation indenting the anterior thecal sac. 3. Left-sided protrusion type L1-2 disc herniation indenting the anterior thecal sac. 4. Central protrusion type L5-S1 disc herniation indenting the anterior thecal sac. 5. 4mm anterolisthesis of L4 on L5 with no spondylolysis. Thank you for the opportunity to provide your interpretation.        Primitivo Zambrano MD       A: MARIANELA 08/02/2021 12:43 PM         AP and lateral lumbar spine films were obtained today and does show multilevel lumbar degenerative disc changes with facet arthropathy and a grade 1 anterior listhesis at L4-5. I see no evidence of of obvious acute compression injury. We also reviewed his previous MRI of his lumbar spine from 8/2/2021. Assessment: #1.  6 days status post recurrent substantially symptomatic mechanical low back pain with known history of underlying lumbar L4-5 MRI documented spondylolisthesis with severe L4-5 and moderate L3-4 spinal stenosis with infrequent episodic leg pain and difficulty with distance walking    Impression:  Encounter Diagnoses   Name Primary? Spondylolisthesis of lumbar region Yes    Lumbar pain        Office Procedures:  Orders Placed This Encounter   Procedures    XR LUMBAR SPINE (2-3 VIEWS)     2V Lumbar     Standing Status:   Future     Number of Occurrences:   1     Standing Expiration Date:   3/21/2023     Order Specific Question:   Reason for exam:     Answer:   LBP       Treatment Plan:  Treatment options were discussed with Rosa Rust. We did once again review his updated plain films, his last lumbar MRI and exam findings. He has known to have underlying lumbar degenerative disc changes with right documented 4 mm L4-5 spondylolisthesis as well as severe central canal stenosis at L4-5 and moderate L3-4. He had been doing reasonably well following his series of epidurals with Dr. Marcell Hodgkin with the last being done on 4/5/2022 and had acute worsening of his pain last week in which worsened severely over the weekend. I see no evidence of acute compression injury but would like for him to have an updated MRI to evaluate for changes in his MRI from August 2021 and to definitively rule out a compression injury. We did place him on a Medrol Dosepak to be followed by use of Voltaren gel 4 g 4 times daily to his back and was given tramadol for breakthrough pain.   We also give him Zanaflex 4 mg to be taking 1 every 8-12 hours for pain and spasm.  I think he would benefit from pain modulation and physical therapy at least several times as he is supposed to leave for [place] for 2 weeks the weekend of 3/4/2022. Hopefully we get his MRI done expeditiously so we can see him back in the office although I did recommend that he at least make an appointment to see Dr. Breezy Alonso as he may require additional epidural intervention. He may utilize his arm warm-and-form brace and will contact us in the interim with questions or concerns. We will see him back post imaging       This dictation was performed with a verbal recognition program (DRAGON) and it was checked for errors. It is possible that there are still dictated errors within this office note. If so, please bring any errors to my attention for an addendum. All efforts were made to ensure that this office note is accurate.

## 2023-02-21 NOTE — TELEPHONE ENCOUNTER
Spoke to patient and informed them that their MRI has been authorized and that they can call and schedule scan at their convenience. Also told them that they can call and schedule a f/u with Dr. Ashwin Valverde once they have MRI scheduled, leaving at least 2-3 days for our office to receive their results.

## 2023-02-21 NOTE — PATIENT INSTRUCTIONS
MAKE APPT WITH DR. Sanchez [facility] [address] - [place], [address] [address]  BEATRIZ Wu 67 [phone]

## 2023-02-22 ENCOUNTER — HOSPITAL ENCOUNTER (OUTPATIENT)
Dept: PHYSICAL THERAPY | Age: 68
Setting detail: THERAPIES SERIES
Discharge: HOME | End: 2023-02-22
Payer: MEDICARE

## 2023-02-22 PROCEDURE — 97110 THERAPEUTIC EXERCISES: CPT

## 2023-02-22 PROCEDURE — 97140 MANUAL THERAPY 1/> REGIONS: CPT

## 2023-02-22 PROCEDURE — 97162 PT EVAL MOD COMPLEX 30 MIN: CPT

## 2023-02-22 PROCEDURE — G0283 ELEC STIM OTHER THAN WOUND: HCPCS

## 2023-02-22 NOTE — PLAN OF CARE
[facility] [address] [place] [address]      Physical Therapy Certification    Dear Referring Provider (secondary): Dr. Brennen Waterman,    We had the pleasure of evaluating the following patient for physical therapy services at [address] [place] [address]. A summary of our findings can be found in the initial assessment below. This includes our plan of care. If you have any questions or concerns regarding these findings, please do not hesitate to contact me at the office phone number checked above. Thank you for the referral.       Physician Signature:_______________________________Date:__________________  By signing above (or electronic signature), therapists plan is approved by physician      Patient: Moisés Baxter   : 1955   MRN: [id number]  Referring Physician: Referring Provider (secondary): Dr. Brennen Waterman      Evaluation Date: 2023      Medical Diagnosis Information:  Diagnosis: M43.16 (ICD-10-CM) - Spondylolisthesis of lumbar region  M54.50 (ICD-10-CM) - Lumbar pain  M48.061 (ICD-10-CM) - Spinal stenosis of lumbar region, unspecified whether neurogenic claudication present   Treatment Diagnosis: M54.5 Lumbar Pain; R53.1 Weakness                                         Insurance information: PT Insurance Information: [facility] Medicare; no deducitble; $20 copay; no auth     Precautions/ Contra-indications: None    C-SSRS Triggered by Intake questionnaire (Past 2 wk assessment):   [x] No, Questionnaire did not trigger screening.   [] Yes, Patient intake triggered further evaluation      [] C-SSRS Screening completed  [] PCP notified via Plan of Care  [] Emergency services notified     Latex Allergy:  [x]NO      []YES  Preferred Language for Healthcare:   [x]English       []other:    SUBJECTIVE: Patient stated complaint: Per chart review:  69year old male presents to PT with complaint of low back pain. Dates back to at least . Last seen in  for this issue.  Has had 3 rounds of epidurals most recently in April 2022. Recent increase in low back pain last week without TELLY - got so intense he had difficulty just moving. Feels spasm. Mostly axial spine pain and into gluteal region but nothing radicular down leg. Leaves for [place] on 3/4/23. Reports he had been walking 4-6 days per week in January up to 6 miles a day. Last week he started to feel discomfort in lower left back and into left glute with a dull ache into anterior left leg/thigh. Pain peaked this weekend with attempting some home stretching. Does feel a bit better today with medication given by MD. Has MRI scheduled for next week to evaluate any progression in lumbar spine disease / disc issues. No numbness or tingling or radicular pain down leg. He does get sharp stabbing pain with movement in lower left side of spine. Prior to medicating, all movements and positions caused pain. (-)numbness and tingling  (-) bilateral radicular symptoms  (-) bowel and/or bladder changes  (-) Saddle Anesthesia  (-) Ataxia  (+) night pain: difficulty getting comfortable  (-) rapid changes in weight of >10 lbs  (+) Hx of cancer - prostate but removed in 2022  (-) Recent trauma  (-) Hx of corticosteroid use  (-) Fever      Imaging: x-rays negative; awaiting new MRI next week  Current Medications: Placed on medrol dose pack yesterday as well as Voltaren gel and Zanaflex; given Toradol for break through pain         Relevant Medical History: Prostate cancer (removed 2022); HTN; HLD; Hernia in 2008;   Functional Disability Index/G-Codes:   OUTCOME MEASURE DATE % Deficit   TANIA 2/22/23 48%              Pain Scale: 4-8/10  Easing factors: Heat; medications; Brace  Provocative factors: Sleeping; standing; position changes; walking; stairs; lifting; OH movements; turning head;      Type: [x]Constant           []Intermittent      []Radiating         [x]Localized         []other:                Numbness/Tingling: Denies Occupation/School:  Retired;      Living Status/Prior Level of Function: Independent with ADLs and IADLs; enjoys walking and golfing; former runner;       OBJECTIVE:     2/22/23  ROM   Comments   Trunk flexion To ankles Feels tight/stiff; slow moving; pain upon extending to neutral   Trunk extension Very limited Increased sharp pain left lumbar spine   Trunk R sidebend Mid thigh \"Feels relief\" left lumbar pain   Trunk L sidebend 25% limited Pain in lower left lumbar spine   Trunk R rotation     Trunk L rotation     HS flexibility WNL both sides SLR ~80 deg                      2/22/23  Strength Left Right Comments   Hip flexion(L2)      Knee extension(L3)      Knee flexion(S1-2)      Ankle dorsiflexion(L4)      Toe extension(L5)      Ankle eversion/plantar flexion(S1)      Hip abd  3+   Increased pain in low back and hip      2/22/23  Special tests   Comments   SLR Neg just HS tightness     Slump test      Pelvic symmetry  WNL standing     Segmental Spinal mobility      FADDIR Groin pain on left     Scour Groin pain on left                     2/22/23 deferred  Dermatomes Normal Abnormal Comments   Inguinal area (L1)       Anterior mid-thigh (L2)      Distal ant thigh/med knee (L3)      Medial lower leg and foot (L4)      Lateral lower leg and foot (L5)      Posterior calf (S1)      Medial calcaneus (S2)                          2/22/23 deferred  Reflexes Normal Abnormal Comments   S1-2 Seated achilles      S1-2 Prone knee bend      L3-4 Patellar tendon      C5-6 Biceps      C6 Brachioradialis      C7-8 Triceps      Clonus      Babinski      Winters's            Joint mobility:               []Normal               [x]Hypo: decreased  and left UPAs L1-L5              []Hyper     Palpation: mild increased tone and TTP along left lumbar paraspinals and QL; moderate TTP left IT band     Functional Mobility/Transfers: slow moving; guarded; painful with transitions     Posture: decreased lumbar lordosis Bandages/Dressings/Incisions: n/a     Gait: (include devices/WB status) antalgic;guarded; slow moving                          [x] Patient history, allergies, meds reviewed. Medical chart reviewed. See intake form. Review Of Systems (ROS):  [x]Performed Review of systems (Integumentary, CardioPulmonary, Neurological) by intake and observation. Intake form has been scanned into medical record. Patient has been instructed to contact their primary care physician regarding ROS issues if not already being addressed at this time. Co-morbidities/Complexities (which will affect course of rehabilitation):   []None              Arthritic conditions   []Rheumatoid arthritis (M05.9)  []Osteoarthritis (M19.91)    Cardiovascular conditions   [x]Hypertension (I10)  [x]Hyperlipidemia (E78.5)  []Angina pectoris (I20)  []Atherosclerosis (I70)    Musculoskeletal conditions   [x]Disc pathology   []Congenital spine pathologies   []Prior surgical intervention  []Osteoporosis (M81.8)  []Osteopenia (M85.8)   Endocrine conditions   []Hypothyroid (E03.9)  []Hyperthyroid Gastrointestinal conditions   []Constipation (P58.98)    Metabolic conditions   []Morbid obesity (E66.01)  []Diabetes type 1(E10.65) or 2 (E11.65)   []Neuropathy (G60.9)      Pulmonary conditions   []Asthma (J45)  []Coughing   []COPD (J44.9)    Psychological Disorders  []Anxiety (F41.9)  []Depression (F32.9)   []Other:    [x]Other: Hx of back pain;  Hx of cancer;            Barriers to/and or personal factors that will affect rehab potential:              [x]Age  [x]Sex              [x]Motivation/Lack of Motivation                        [x]Co-Morbidities              []Cognitive Function, education/learning barriers              []Environmental, home barriers              []profession/work barriers  [x]past PT/medical experience  []other:  Justification: Been to PT in the past for low back with some success; highly active and motivated individual      Falls Risk Assessment (30 days):   [x] Falls Risk assessed and no intervention required. [] Falls Risk assessed and Patient requires intervention due to being higher risk   TUG score (>12s at risk):     [] Falls education provided, including       G-Codes:   OUTCOME MEASURE DATE % Deficit   TANIA 2/22/23 48%             ASSESSMENT:   Functional Impairments:                [x]Noted lumbar/proximal hip hypomobility              []Noted lumbosacral and/or generalized hypermobility              [x]Decreased Lumbosacral/hip/LE functional ROM              [x]Decreased core/proximal hip strength and neuromuscular control               [x]Decreased LE functional strength               []Abnormal reflexes/sensation/myotomal/dermatomal deficits  []Reduced balance/proprioceptive control               []other:       Functional Activity Limitations (from functional questionnaire and intake)              [x]Reduced ability to tolerate prolonged functional positions              [x]Reduced ability or difficulty with changes of positions or transfers between positions              [x]Reduced ability to maintain good posture and demonstrate good body mechanics with sitting, bending, and lifting              [x]Reduced ability to sleep              [x] Reduced ability or tolerance with driving and/or computer work              [x]Reduced ability to perform lifting, reaching, carrying tasks              [x]Reduced ability to squat              [x]Reduced ability to forward bend              [x]Reduced ability to ambulate prolonged functional periods/distances/surfaces              [x]Reduced ability to ascend/descend stairs              []other:       Participation Restrictions              [x]Reduced participation in self care activities              [x]Reduced participation in home management activities              []Reduced participation in work activities              [x]Reduced participation in social activities.               [x]Reduced participation in sport/recreational activities. Classification:              []Signs/symptoms consistent with Lumbar instability/stabilization subgroup. [x]Signs/symptoms consistent with Lumbar mobilization/manipulation subgroup, myotomes and dermatomes intact. Meets manipulation criteria. []Signs/symptoms consistent with Lumbar direction specific/centralization subgroup              []Signs/symptoms consistent with Lumbar traction subgroup                            [x]Signs/symptoms consistent with lumbar facet dysfunction              []Signs/symptoms consistent with lumbar stenosis type dysfunction              []Signs/symptoms consistent with nerve root involvement including myotome & dermatome dysfunction              []Signs/symptoms consistent with post-surgical status including: decreased ROM, strength and function.               [x]signs/symptoms consistent with pathology which may benefit from Dry needling                []other:       Prognosis/Rehab Potential:                                       []Excellent              [x]Good                 []Fair              []Poor     Tolerance of evaluation/treatment:               []Excellent              []Good                 [x]Fair              []Poor     Physical Therapy Evaluation Complexity Justification  [x] A history of present problem with:  [] no personal factors and/or comorbidities that impact the plan of care;  []1-2 personal factors and/or comorbidities that impact the plan of care  [x]3 personal factors and/or comorbidities that impact the plan of care  [x] An examination of body systems using standardized tests and measures addressing any of the following: body structures and functions (impairments), activity limitations, and/or participation restrictions;:  [] a total of 1-2 or more elements   [] a total of 3 or more elements   [x] a total of 4 or more elements   [x] A clinical presentation with:  [] stable and/or uncomplicated characteristics   [x] evolving clinical presentation with changing characteristics  [] unstable and unpredictable characteristics;   [x] Clinical decision making of [] low, [x] moderate, [] high complexity using standardized patient assessment instrument and/or measurable assessment of functional outcome. [] EVAL (LOW) 85054 (typically 20 minutes face-to-face)  [x] EVAL (MOD) 82879 (typically 30 minutes face-to-face)  [] EVAL (HIGH) 61749 (typically 45 minutes face-to-face)  [] RE-EVAL            PLAN:      Frequency/Duration:  2 days per week for 4 Weeks:  Interventions:  [x]  Therapeutic exercise including: strength training, ROM, for LE, Glutes and core   [x]  NMR activation and proprioception for glutes , LE and Core   [x]  Manual therapy as indicated for Hip complex, LE and spine to include: Dry Needling/IASTM, STM, PROM, Gr I-IV mobilizations, manipulation. [x]  Modalities as needed that may include: thermal agents, E-stim, Biofeedback, US, iontophoresis as indicated  [x]  Patient education on joint protection, postural re-education, activity modification, progression of HEP. HEP instruction:  Patient instructed on HEP on this date with handout provided and all questions answered. Discussions about how to progress sets/reps/resistance as necessary for fatigue and challenge. Patient was instructed to contact PT with any questions or concerns about HEP moving forward. Patient verbally stated she/he understood. Access Code: [email or web address]  URL: [email or web address]  Date: 02/22/2023  Prepared by:  Krzysztof Valentin    Exercises  Hooklying Single Knee to Chest Stretch - 1 x daily - 7 x weekly - 3 sets - 30 hold  Supine Double Knee to Chest Modified - 1 x daily - 7 x weekly - 3 sets - 30 hold  Supine Lower Trunk Rotation - 1 x daily - 7 x weekly - 10 reps - 10 hold  Supine Posterior Pelvic Tilt - 1 x daily - 7 x weekly - 10 reps - 5 hold        GOALS:   Patient stated goal: Return to golfing    [] Progressing: [] Met: [] Not Met: [] Adjusted    Therapist goals for Patient:   Short Term Goals: To be achieved in: 2 weeks  1. Independent in HEP and progression per patient tolerance, in order to prevent re-injury. [] Progressing: [] Met: [] Not Met: [] Adjusted   2. Patient will have a decrease in pain to facilitate improvement in movement, function, and ADLs as indicated by Functional Deficits. [] Progressing: [] Met: [] Not Met: [] Adjusted    Long Term Goals: To be achieved in: 4 weeks  1. Disability index score of 24% or less for the TANIA  to assist with reaching prior level of function. [] Progressing: [] Met: [] Not Met: [] Adjusted  2. Patient will demonstrate increased AROM to WNL, good LS mobility, good hip ROM to allow for proper joint functioning as indicated by patients Functional Deficits. [] Progressing: [] Met: [] Not Met: [] Adjusted  3. Patient will demonstrate an increase in Strength to 4+/5 or greater left hip abduction to allow for proper functional mobility as indicated by patients Functional Deficits. [] Progressing: [] Met: [] Not Met: [] Adjusted  4. Patient will return to ADL and other functional activities without increased symptoms or restriction. [] Progressing: [] Met: [] Not Met: [] Adjusted  5.  Patient will return to walking > 30 minutes with <2/10 pain in low back. (patient specific functional goal)    [] Progressing: [] Met: [] Not Met: [] Adjusted       Electronically signed by: Driss Lizama, PT, DPT, OCS

## 2023-02-22 NOTE — FLOWSHEET NOTE
[address] [facility], [place]    Physical Therapy Daily Treatment Note  Date:  2023    Patient Name:  Anayeli Flor    :  1955  MRN: [id number]  Restrictions/Precautions:    Medical/Treatment Diagnosis Information:  Diagnosis: M43.16 (ICD-10-CM) - Spondylolisthesis of lumbar region  M54.50 (ICD-10-CM) - Lumbar pain  M48.061 (ICD-10-CM) - Spinal stenosis of lumbar region, unspecified whether neurogenic claudication present  Treatment Diagnosis: M54.5 Lumbar Pain; R53.1 Weakness  Insurance/Certification information:  PT Insurance Information: [facility] Medicare; no deducitble; $20 copay; no auth  Physician Information:  Referring Provider (secondary): Dr. Ladarius Mondragon  Has the plan of care been signed (Y/N):        []  Yes  [x]  No     Date of Patient follow up with Physician: after MRI      Is this a Progress Report:     []  Yes  [x]  No        If Yes:  Date Range for reporting period:  Beginnin23  Ending    Progress report will be due (10 Rx or 30 days whichever is less): 4/39/15       Recertification will be due (POC Duration  / 90 days whichever is less): 3/22/23         Visit # Insurance Allowable Auth Required   1 BMN []  Yes [x]  No        OUTCOME MEASURE DATE % Deficit   TANIA 23 48%                  Latex Allergy:  [x]NO      []YES  Preferred Language for Healthcare:   [x]English       []other:      Pain level:  4-8/10     SUBJECTIVE:  See eval    OBJECTIVE:       23  ROM   Comments   Trunk flexion To ankles Feels tight/stiff; slow moving; pain upon extending to neutral   Trunk extension Very limited Increased sharp pain left lumbar spine   Trunk R sidebend Mid thigh \"Feels relief\" left lumbar pain   Trunk L sidebend 25% limited Pain in lower left lumbar spine   Trunk R rotation       Trunk L rotation       HS flexibility WNL both sides SLR ~80 deg                      23  Strength Left Right Comments   Hip flexion(L2)         Knee extension(L3) Knee flexion(S1-2)         Ankle dorsiflexion(L4)         Toe extension(L5)         Ankle eversion/plantar flexion(S1)         Hip abd  3+   Increased pain in low back and hip      2/22/23  Special tests   Comments   SLR Neg just HS tightness     Slump test       Pelvic symmetry  WNL standing     Segmental Spinal mobility       FADDIR Groin pain on left     Scour Groin pain on left                        2/22/23 deferred  Dermatomes Normal Abnormal Comments   Inguinal area (L1)          Anterior mid-thigh (L2)         Distal ant thigh/med knee (L3)         Medial lower leg and foot (L4)         Lateral lower leg and foot (L5)         Posterior calf (S1)         Medial calcaneus (S2)                                          2/22/23 deferred  Reflexes Normal Abnormal Comments   S1-2 Seated achilles         S1-2 Prone knee bend         L3-4 Patellar tendon         C5-6 Biceps         C6 Brachioradialis         C7-8 Triceps         Clonus         Babinski         Winters's                  Joint mobility:               []Normal               [x]Hypo: decreased  and left UPAs L1-L5              []Hyper     Palpation: mild increased tone and TTP along left lumbar paraspinals and QL; moderate TTP left IT band     Functional Mobility/Transfers: slow moving; guarded; painful with transitions     Posture: decreased lumbar lordosis     Bandages/Dressings/Incisions: n/a     Gait: (include devices/WB status) antalgic;guarded; slow moving      RESTRICTIONS/PRECAUTIONS: Prostate cancer (removed 2022); HTN; HLD; Hernia in 2008  Exercises/Interventions:   ROM/stretches     SKTC 3x30\"ea    DKTC 3x30\"    Hand Heel Rock     Child's Pose     Prone Press Up     Hamstring     Quadriceps     Piriformis     Figure 4     Posterior Pelvic tilt 10x5\"    Hook lying rotation 10x10\"    Cat and camel          Strengthening     CORE     TA Iso     TA March     TA Heel Slide     TA BKFO     Table Top     [employer] Plank     Side Plank     [employer]     Stir the Pot          GLUTES     Glute iso     Standing hip abd iso  Ball into wall   Bridge     Supine Clamshell     Side lying clamshell     SLR Abduction     SLR Extension     Lateral band walks     [place] Wal[place]                        Manual Intervention  Time / Reps Notes        Prone PA     IASTM 8 minutes Left lumbar paraspinals; QL   Lumbar Manip     SI Manip     Lumbar Traction     Hip Traction            Therapeutic Exercise and NMR EXR  [x] (55391) Provided verbal/tactile cueing for activities related to strengthening, flexibility, endurance, ROM  for improvements in proximal hip and core control with self care, mobility, lifting and ambulation. [x] (25419) Provided verbal/tactile cueing for activities related to improving balance, coordination, kinesthetic sense, posture, motor skill, proprioception  to assist with core control in self care, mobility, lifting, and ambulation.      Therapeutic Activities:    [x] (93868 or 42036) Provided verbal/tactile cueing for activities related to improving balance, coordination, kinesthetic sense, posture, motor skill, proprioception and motor activation to allow for proper function  with self care and ADLs  [] (23345) Provided training and instruction to the patient for proper core and proximal hip recruitment and positioning with ambulation re-education     Home Exercise Program:    [x] (64976) Reviewed/Progressed HEP activities related to strengthening, flexibility, endurance, ROM of core, proximal hip and LE for functional self-care, mobility, lifting and ambulation   [] (03060) Reviewed/Progressed HEP activities related to improving balance, coordination, kinesthetic sense, posture, motor skill, proprioception of core, proximal hip and LE for self care, mobility, lifting, and ambulation      Manual Treatments:  PROM / STM / Oscillations-Mobs:  G-I, II, III, IV (PA's, Inf., Post.)  [x] (48096) Provided manual therapy to mobilize proximal hip and LS spine soft tissue/joints for the purpose of modulating pain, promoting relaxation,  increasing ROM, reducing/eliminating soft tissue swelling/inflammation/restriction, improving soft tissue extensibility and allowing for proper ROM for normal function with self care, mobility, lifting and ambulation. Modalities:   Hot pack and IFC e-stim x 15'    Charges:  Timed Code Treatment Minutes: 30   Total Treatment Minutes: 60       [] EVAL (LOW) 92975 (typically 20 minutes face-to-face)  [x] EVAL (MOD) 82414 (typically 30 minutes face-to-face)  [] EVAL (HIGH) 79136 (typically 45 minutes face-to-face)  [] RE-EVAL     [x] MO(01318) x  1   [] IONTO  [] NMR (80221) x     [] VASO  [x] Manual (64387) x   1   [] Other:  [] TA x      [] Mech Traction (05174)  [] ES(attended) (60640)      [x] ES (un) (13803):     GOALS:   Patient stated goal: Return to golfing    [] Progressing: [] Met: [] Not Met: [] Adjusted    Therapist goals for Patient:   Short Term Goals: To be achieved in: 2 weeks  1. Independent in HEP and progression per patient tolerance, in order to prevent re-injury. [] Progressing: [] Met: [] Not Met: [] Adjusted   2. Patient will have a decrease in pain to facilitate improvement in movement, function, and ADLs as indicated by Functional Deficits. [] Progressing: [] Met: [] Not Met: [] Adjusted    Long Term Goals: To be achieved in: 4 weeks  1. Disability index score of 24% or less for the TANIA  to assist with reaching prior level of function. [] Progressing: [] Met: [] Not Met: [] Adjusted  2. Patient will demonstrate increased AROM to WNL, good LS mobility, good hip ROM to allow for proper joint functioning as indicated by patients Functional Deficits. [] Progressing: [] Met: [] Not Met: [] Adjusted  3.  Patient will demonstrate an increase in Strength to 4+/5 or greater left hip abduction to allow for proper functional mobility as indicated by patients Functional Deficits. [] Progressing: [] Met: [] Not Met: [] Adjusted  4. Patient will return to ADL and other functional activities without increased symptoms or restriction. [] Progressing: [] Met: [] Not Met: [] Adjusted  5. Patient will return to walking > 30 minutes with <2/10 pain in low back. (patient specific functional goal)    [] Progressing: [] Met: [] Not Met: [] Adjusted       Overall Progression Towards Functional goals/ Treatment Progress Update:  [] Patient is progressing as expected towards functional goals listed. [] Progression is slowed due to complexities/Impairments listed. [] Progression has been slowed due to co-morbidities. [x] Plan just implemented, too soon to assess goals progression <30days   [] Goals require adjustment due to lack of progress  [] Patient is not progressing as expected and requires additional follow up with physician  [] Other    Prognosis for POC: [x] Good [] Fair  [] Poor      Patient requires continued skilled intervention: [x] Yes  [] No    Treatment/Activity Tolerance:  [] Patient able to complete treatment  [] Patient limited by fatigue  [x] Patient limited by pain     [] Patient limited by other medical complications  [] Other:         Prognosis: [x] Good [] Fair  [] Poor    Patient Requires Follow-up: [x] Yes  [] No    PLAN: See eval  [] Continue per plan of care [] Alter current plan (see comments)  [x] Plan of care initiated [] Hold pending MD visit [] Discharge    Electronically signed by: Joana Ortez, PT, DPT, OCS    *If patient does not return for further follow ups after this date. Please consider this as the patients discharge from physical therapy.

## 2023-02-23 DIAGNOSIS — M19.072 PRIMARY OSTEOARTHRITIS OF LEFT FOOT: ICD-10-CM

## 2023-02-23 DIAGNOSIS — Q66.71 PES CAVUS OF BOTH FEET: Primary | ICD-10-CM

## 2023-02-23 DIAGNOSIS — Q66.72 PES CAVUS OF BOTH FEET: Primary | ICD-10-CM

## 2023-02-24 ENCOUNTER — HOSPITAL ENCOUNTER (OUTPATIENT)
Dept: PHYSICAL THERAPY | Age: 68
Setting detail: THERAPIES SERIES
Discharge: HOME | End: 2023-02-24
Payer: MEDICARE

## 2023-02-24 PROCEDURE — 97112 NEUROMUSCULAR REEDUCATION: CPT

## 2023-02-24 PROCEDURE — G0283 ELEC STIM OTHER THAN WOUND: HCPCS

## 2023-02-24 PROCEDURE — 97110 THERAPEUTIC EXERCISES: CPT

## 2023-02-24 PROCEDURE — 97140 MANUAL THERAPY 1/> REGIONS: CPT

## 2023-02-24 NOTE — FLOWSHEET NOTE
[address] [facility], [place]    Physical Therapy Daily Treatment Note  Date:  2023    Patient Name:  Grant Otero    :  1955  MRN: [id number]  Restrictions/Precautions:    Medical/Treatment Diagnosis Information:  Diagnosis: M43.16 (ICD-10-CM) - Spondylolisthesis of lumbar region  M54.50 (ICD-10-CM) - Lumbar pain  M48.061 (ICD-10-CM) - Spinal stenosis of lumbar region, unspecified whether neurogenic claudication present  Treatment Diagnosis: M54.5 Lumbar Pain; R53.1 Weakness  Insurance/Certification information:  PT Insurance Information: [facility] Medicare; no deducitble; $20 copay; no auth  Physician Information:  Referring Provider (secondary): Dr. Beatriz Bennett  Has the plan of care been signed (Y/N):        [x]  Yes  []  No     Date of Patient follow up with Physician: after MRI      Is this a Progress Report:     []  Yes  [x]  No        If Yes:  Date Range for reporting period:  Beginnin23  Ending    Progress report will be due (10 Rx or 30 days whichever is less):        Recertification will be due (POC Duration  / 90 days whichever is less): 3/22/23         Visit # Insurance Allowable Auth Required   2 BMN []  Yes [x]  No        OUTCOME MEASURE DATE % Deficit   TANIA 23 48%                  Latex Allergy:  [x]NO      []YES  Preferred Language for Healthcare:   [x]English       []other:      Pain level:  2/10     SUBJECTIVE:  Feeling a lot better. Still has mild soreness anterior left leg when walking or standing. Also  continued left low back pain but intensity much le ss.      OBJECTIVE:       23  ROM   Comments   Trunk flexion To ankles Feels tight/stiff; slow moving; pain upon extending to neutral   Trunk extension Very limited Increased sharp pain left lumbar spine   Trunk R sidebend Mid thigh \"Feels relief\" left lumbar pain   Trunk L sidebend 25% limited Pain in lower left lumbar spine   Trunk R rotation       Trunk L rotation       HS flexibility WNL both sides SLR ~80 deg                      2/22/23  Strength Left Right Comments   Hip flexion(L2)         Knee extension(L3)         Knee flexion(S1-2)         Ankle dorsiflexion(L4)         Toe extension(L5)         Ankle eversion/plantar flexion(S1)         Hip abd  3+   Increased pain in low back and hip      2/22/23  Special tests   Comments   SLR Neg just HS tightness     Slump test       Pelvic symmetry  WNL standing     Segmental Spinal mobility       FADDIR Groin pain on left     Scour Groin pain on left                        2/22/23 deferred  Dermatomes Normal Abnormal Comments   Inguinal area (L1)          Anterior mid-thigh (L2)         Distal ant thigh/med knee (L3)         Medial lower leg and foot (L4)         Lateral lower leg and foot (L5)         Posterior calf (S1)         Medial calcaneus (S2)                                          2/22/23 deferred  Reflexes Normal Abnormal Comments   S1-2 Seated achilles         S1-2 Prone knee bend         L3-4 Patellar tendon         C5-6 Biceps         C6 Brachioradialis         C7-8 Triceps         Clonus         Babinski         Winters's                  Joint mobility:               []Normal               [x]Hypo: decreased  and left UPAs L1-L5              []Hyper     Palpation: mild increased tone and TTP along left lumbar paraspinals and QL; moderate TTP left IT band     Functional Mobility/Transfers: slow moving; guarded; painful with transitions     Posture: decreased lumbar lordosis     Bandages/Dressings/Incisions: n/a     Gait: (include devices/WB status) antalgic;guarded; slow moving      RESTRICTIONS/PRECAUTIONS: Prostate cancer (removed 2022); HTN; HLD; Hernia in 2008  Exercises/Interventions:   ROM/stretches     SKTC 3x30\"ea       Hand Heel Rock     Child's Pose 3x30\"    Prone Press Up     Hamstring     Quadriceps     Piriformis     Figure 4     Posterior Pelvic tilt 10x5\"    Hook lying rotation 10x10\"    Cat and camel Strengthening     CORE     TA Iso 5x10\"    TA March     TA Heel Slide     TA BKFO     Table Top 3x10ea    Dead Bug     Bird Dog 3x10    Front Plank     Modified Side Plank 3x15\"ea    [place] Press 10x10\"ea Black Seated on blue SB   Stir the Pot          GLUTES     Glute iso     Standing hip abd iso  Ball into wall   Bridge 3x10 [place] at knees   Supine Clamshell     Side lying clamshell     SLR Abduction 3x10 L Only    SLR Extension     Lateral band walks     [employer]                        Manual Intervention  Time / Reps Notes        Prone PA     Lumbar Manip X1 each side    SI Manip     Lumbar Traction     Hip Traction 3'           Therapeutic Exercise and NMR EXR  [x] (62580) Provided verbal/tactile cueing for activities related to strengthening, flexibility, endurance, ROM  for improvements in proximal hip and core control with self care, mobility, lifting and ambulation. [x] (70161) Provided verbal/tactile cueing for activities related to improving balance, coordination, kinesthetic sense, posture, motor skill, proprioception  to assist with core control in self care, mobility, lifting, and ambulation.      Therapeutic Activities:    [x] (60049 or 43111) Provided verbal/tactile cueing for activities related to improving balance, coordination, kinesthetic sense, posture, motor skill, proprioception and motor activation to allow for proper function  with self care and ADLs  [] (33342) Provided training and instruction to the patient for proper core and proximal hip recruitment and positioning with ambulation re-education     Home Exercise Program:    [x] (29569) Reviewed/Progressed HEP activities related to strengthening, flexibility, endurance, ROM of core, proximal hip and LE for functional self-care, mobility, lifting and ambulation   [x] (50083) Reviewed/Progressed HEP activities related to improving balance, coordination, kinesthetic sense, posture, motor skill, proprioception of core, proximal hip and LE for self care, mobility, lifting, and ambulation      Patient instructed on HEP on this date with handout provided and all questions answered. Discussions about how to progress sets/reps/resistance as necessary for fatigue and challenge. Patient was instructed to contact PT with any questions or concerns about HEP moving forward. Patient verbally stated she/he understood  Access Code: [email or web address]  URL: [email or web address]  Date: 02/24/2023  Prepared by: Krzysztof Gingras    Exercises  Hooklying Single Knee to Chest Stretch - 1 x daily - 7 x weekly - 3 sets - 30 hold  Supine Double Knee to Chest Modified - 1 x daily - 7 x weekly - 3 sets - 30 hold  Child's Pose Stretch - 1 x daily - 7 x weekly - 3 sets - 30 hold  Supine Lower Trunk Rotation - 1 x daily - 7 x weekly - 10 reps - 10 hold  Supine Posterior Pelvic Tilt - 1 x daily - 7 x weekly - 10 reps - 5 hold  Supine 90/90 Alternating Heel Touches with Posterior Pelvic Tilt - 1 x daily - 7 x weekly - 3 sets - 10 reps  Bird Dog - 1 x daily - 7 x weekly - 3 sets - 10 reps  Sidelying Forearm Plank with Knees Bent - 1 x daily - 7 x weekly - 3 sets - 15 hold  Standing Anti-Rotation Press with Anchored Resistance - 1 x daily - 7 x weekly - 10 reps - 10 hold  Supine Bridge with Resistance Band - 1 x daily - 7 x weekly - 3 sets - 10 reps  Sidelying Hip Abduction - 1 x daily - 7 x weekly - 3 sets - 10 reps      . Manual Treatments:  PROM / STM / Oscillations-Mobs:  G-I, II, III, IV (PA's, Inf., Post.)  [x] (79541) Provided manual therapy to mobilize proximal hip and LS spine soft tissue/joints for the purpose of modulating pain, promoting relaxation,  increasing ROM, reducing/eliminating soft tissue swelling/inflammation/restriction, improving soft tissue extensibility and allowing for proper ROM for normal function with self care, mobility, lifting and ambulation.      Modalities:   Hot pack and IFC e-stim x 15'    Charges:  Timed Code Treatment Minutes: 40   Total Treatment Minutes: 55       [] EVAL (LOW) 47595 (typically 20 minutes face-to-face)  [] EVAL (MOD) 81987 (typically 30 minutes face-to-face)  [] EVAL (HIGH) 83706 (typically 45 minutes face-to-face)  [] RE-EVAL     [x] QG(42499) x  1   [] IONTO  [x] NMR (38677) x   1  [] VASO  [x] Manual (42816) x   1   [] Other:  [] TA x      [] Mech Traction (95894)  [] ES(attended) (07703)      [x] ES (un) (75860):     GOALS:   Patient stated goal: Return to golfing    [] Progressing: [] Met: [] Not Met: [] Adjusted    Therapist goals for Patient:   Short Term Goals: To be achieved in: 2 weeks  1. Independent in HEP and progression per patient tolerance, in order to prevent re-injury. [] Progressing: [] Met: [] Not Met: [] Adjusted   2. Patient will have a decrease in pain to facilitate improvement in movement, function, and ADLs as indicated by Functional Deficits. [] Progressing: [] Met: [] Not Met: [] Adjusted    Long Term Goals: To be achieved in: 4 weeks  1. Disability index score of 24% or less for the TANIA  to assist with reaching prior level of function. [] Progressing: [] Met: [] Not Met: [] Adjusted  2. Patient will demonstrate increased AROM to WNL, good LS mobility, good hip ROM to allow for proper joint functioning as indicated by patients Functional Deficits. [] Progressing: [] Met: [] Not Met: [] Adjusted  3. Patient will demonstrate an increase in Strength to 4+/5 or greater left hip abduction to allow for proper functional mobility as indicated by patients Functional Deficits. [] Progressing: [] Met: [] Not Met: [] Adjusted  4. Patient will return to ADL and other functional activities without increased symptoms or restriction. [] Progressing: [] Met: [] Not Met: [] Adjusted  5.  Patient will return to walking > 30 minutes with <2/10 pain in low back. (patient specific functional goal)    [] Progressing: [] Met: [] Not Met: [] Adjusted       Overall Progression Towards Functional goals/ Treatment Progress Update:  [] Patient is progressing as expected towards functional goals listed. [] Progression is slowed due to complexities/Impairments listed. [] Progression has been slowed due to co-morbidities. [x] Plan just implemented, too soon to assess goals progression <30days   [] Goals require adjustment due to lack of progress  [] Patient is not progressing as expected and requires additional follow up with physician  [] Other    Prognosis for POC: [x] Good [] Fair  [] Poor      Patient requires continued skilled intervention: [x] Yes  [] No    Treatment/Activity Tolerance:  [x] Patient able to complete treatment  [] Patient limited by fatigue  [] Patient limited by pain     [] Patient limited by other medical complications  [] Other: Much better tolerance to visit today. Tolerated progressions in both core and glute strengthening without increase in low back pain. [place] heard with both sides on lumbar manipulations. Updated HEP and will follow up next week. Progress as tolerated with focus on further core and glute strengthening. Prognosis: [x] Good [] Fair  [] Poor    Patient Requires Follow-up: [x] Yes  [] No    PLAN: See eval  [] Continue per plan of care [] Alter current plan (see comments)  [x] Plan of care initiated [] Hold pending MD visit [] Discharge    Electronically signed by: Curt Bower, PT, DPT, OCS    *If patient does not return for further follow ups after this date. Please consider this as the patients discharge from physical therapy.

## 2023-02-27 ENCOUNTER — HOSPITAL ENCOUNTER (OUTPATIENT)
Dept: PHYSICAL THERAPY | Age: 68
Setting detail: THERAPIES SERIES
Discharge: HOME | End: 2023-02-27
Payer: MEDICARE

## 2023-02-27 PROCEDURE — 97112 NEUROMUSCULAR REEDUCATION: CPT

## 2023-02-27 PROCEDURE — 97110 THERAPEUTIC EXERCISES: CPT

## 2023-02-27 NOTE — FLOWSHEET NOTE
[address] [facility], [place]    Physical Therapy Daily Treatment Note  Date:  2023    Patient Name:  Horace Gasca    :  1955  MRN: [id number]  Restrictions/Precautions:    Medical/Treatment Diagnosis Information:  Diagnosis: M43.16 (ICD-10-CM) - Spondylolisthesis of lumbar region  M54.50 (ICD-10-CM) - Lumbar pain  M48.061 (ICD-10-CM) - Spinal stenosis of lumbar region, unspecified whether neurogenic claudication present  Treatment Diagnosis: M54.5 Lumbar Pain; R53.1 Weakness  Insurance/Certification information:  PT Insurance Information: [facility] Medicare; no deducitble; $20 copay; no auth  Physician Information:  Referring Provider (secondary): Dr. Koby Almazan  Has the plan of care been signed (Y/N):        [x]  Yes  []  No     Date of Patient follow up with Physician: after MRI (MRI scheduled 3/2/23)      Is this a Progress Report:     []  Yes  [x]  No        If Yes:  Date Range for reporting period:  Beginnin23  Ending    Progress report will be due (10 Rx or 30 days whichever is less): 40       Recertification will be due (POC Duration  / 90 days whichever is less): 3/22/23         Visit # Insurance Allowable Auth Required   3 BMN []  Yes [x]  No        OUTCOME MEASURE DATE % Deficit   TANIA 23 48%                 Latex Allergy:  [x]NO      []YES  Preferred Language for Healthcare:   [x]English       []other:      Pain level:  0/10     SUBJECTIVE:  Continues to do well. No pain at rest this morning. No issues after Friday's visit as far as back pain or muscle soreness. MRI scheduled for Thursday. Leaves for [place] early  morning.       OBJECTIVE:       23  ROM   Comments   Trunk flexion To ankles Feels tight/stiff; slow moving; pain upon extending to neutral   Trunk extension Very limited Increased sharp pain left lumbar spine   Trunk R sidebend Mid thigh \"Feels relief\" left lumbar pain   Trunk L sidebend 25% limited Pain in lower left lumbar spine   Trunk R rotation       Trunk L rotation       HS flexibility WNL both sides SLR ~80 deg                      2/22/23  Strength Left Right Comments   Hip flexion(L2)         Knee extension(L3)         Knee flexion(S1-2)         Ankle dorsiflexion(L4)         Toe extension(L5)         Ankle eversion/plantar flexion(S1)         Hip abd  3+   Increased pain in low back and hip      2/22/23  Special tests   Comments   SLR Neg just HS tightness     Slump test       Pelvic symmetry  WNL standing     Segmental Spinal mobility       FADDIR Groin pain on left     Scour Groin pain on left                        2/22/23 deferred  Dermatomes Normal Abnormal Comments   Inguinal area (L1)          Anterior mid-thigh (L2)         Distal ant thigh/med knee (L3)         Medial lower leg and foot (L4)         Lateral lower leg and foot (L5)         Posterior calf (S1)         Medial calcaneus (S2)                                          2/22/23 deferred  Reflexes Normal Abnormal Comments   S1-2 Seated achilles         S1-2 Prone knee bend         L3-4 Patellar tendon         C5-6 Biceps         C6 Brachioradialis         C7-8 Triceps         Clonus         Babinski         Winters's                  Joint mobility:               []Normal               [x]Hypo: decreased  and left UPAs L1-L5              []Hyper     Palpation: mild increased tone and TTP along left lumbar paraspinals and QL; moderate TTP left IT band     Functional Mobility/Transfers: slow moving; guarded; painful with transitions     Posture: decreased lumbar lordosis     Bandages/Dressings/Incisions: n/a     Gait: (include devices/WB status) antalgic;guarded; slow moving      RESTRICTIONS/PRECAUTIONS: Prostate cancer (removed 2022); HTN; HLD; Hernia in 2008  Exercises/Interventions:   ROM/stretches     SKTC 3x30\"ea       Hand Heel Rock     Child's Pose 3x30\"    Prone Press Up     Hamstring     Quadriceps 3x30\"ea    Piriformis     Figure 4 Hook lying rotation 10x10\"    Cat and camel          Strengthening     CORE        TA March     TA Heel Slide     TA BKFO     Table Top 3x10ea    Dead Bug 3x10ea    Bird Dog 3x10    Front Plank 3x20\" Knees / forearms   Modified Side Plank 2x15\"ea    [place] Press 10x10\"ea Black Seated on blue SB   Stir the Pot          GLUTES     Glute iso     Standing hip abd iso  Ball into wall   Bridge 3x10 Red loop at knees   Supine Clamshell     Side lying clamshell 3x10ea Red   SLR Abduction 3x10 L Only    SLR Extension     Lateral band walks     [employer]                        Manual Intervention  Time / Reps Notes        Prone PA        SI Manip     Lumbar Traction               Therapeutic Exercise and NMR EXR  [x] (98517) Provided verbal/tactile cueing for activities related to strengthening, flexibility, endurance, ROM  for improvements in proximal hip and core control with self care, mobility, lifting and ambulation. [x] (17538) Provided verbal/tactile cueing for activities related to improving balance, coordination, kinesthetic sense, posture, motor skill, proprioception  to assist with core control in self care, mobility, lifting, and ambulation.      Therapeutic Activities:    [x] (52398 or 10985) Provided verbal/tactile cueing for activities related to improving balance, coordination, kinesthetic sense, posture, motor skill, proprioception and motor activation to allow for proper function  with self care and ADLs  [] (72467) Provided training and instruction to the patient for proper core and proximal hip recruitment and positioning with ambulation re-education     Home Exercise Program:    [x] (19247) Reviewed/Progressed HEP activities related to strengthening, flexibility, endurance, ROM of core, proximal hip and LE for functional self-care, mobility, lifting and ambulation   [x] (60308) Reviewed/Progressed HEP activities related to improving balance, coordination, kinesthetic sense, posture, motor skill, proprioception of core, proximal hip and LE for self care, mobility, lifting, and ambulation      Patient instructed on HEP on this date with handout provided and all questions answered. Discussions about how to progress sets/reps/resistance as necessary for fatigue and challenge. Patient was instructed to contact PT with any questions or concerns about HEP moving forward. Patient verbally stated she/he understood  Access Code: [email or web address]  URL: [employer]/  Date: 02/27/2023  Prepared by: Krzysztof Valentin    Exercises  Hooklying Single Knee to Chest Stretch - 1 x daily - 7 x weekly - 3 sets - 30 hold  Child's Pose Stretch - 1 x daily - 7 x weekly - 3 sets - 30 hold  Supine Lower Trunk Rotation - 1 x daily - 7 x weekly - 10 reps - 10 hold  Supine 90/90 Alternating Heel Touches with Posterior Pelvic Tilt - 1 x daily - 3-4 x weekly - 3 sets - 10 reps  Supine Dead Bug with Leg Extension - 1 x daily - 3-4 x weekly - 3 sets - 10 reps  Bird Dog - 1 x daily - 3-4 x weekly - 3 sets - 10 reps  Sidelying Forearm Plank with Knees Bent - 1 x daily - 3-4 x weekly - 3 sets - 15 hold  Plank on Knees - 1 x daily - 3-4 x weekly - 3 sets - 20 hold  Standing Anti-Rotation Press with Anchored Resistance - 1 x daily - 3-4 x weekly - 10 reps - 10 hold  Supine Bridge with Resistance Band - 1 x daily - 3-4 x weekly - 3 sets - 10 reps  Sidelying Hip Abduction - 1 x daily - 3-4 x weekly - 3 sets - 10 reps  Clamshell with Resistance - 1 x daily - 3-4 x weekly - 3 sets - 10 reps      .      Manual Treatments:  PROM / STM / Oscillations-Mobs:  G-I, II, III, IV (PA's, Inf., Post.)  [x] (90455) Provided manual therapy to mobilize proximal hip and LS spine soft tissue/joints for the purpose of modulating pain, promoting relaxation,  increasing ROM, reducing/eliminating soft tissue swelling/inflammation/restriction, improving soft tissue extensibility and allowing for proper ROM for normal function with self care, mobility, lifting and ambulation.     Modalities:     Charges:  Timed Code Treatment Minutes: 45'   Total Treatment Minutes: 45'       [] EVAL (LOW) 43247 (typically 20 minutes face-to-face)  [] EVAL (MOD) 02934 (typically 30 minutes face-to-face)  [] EVAL (HIGH) 73601 (typically 45 minutes face-to-face)  [] RE-EVAL     [x] TE(68152) x  1   [] IONTO  [x] NMR (44464) x   2  [] VASO  [] Manual (47690) x   1   [] Other:  [] TA x      [] Mech Traction (42382)  [] ES(attended) (01270)      [] ES (un) (43563):     GOALS:   Patient stated goal: Return to golfing    [] Progressing: [] Met: [] Not Met: [] Adjusted    Therapist goals for Patient:   Short Term Goals: To be achieved in: 2 weeks  1. Independent in HEP and progression per patient tolerance, in order to prevent re-injury.     [] Progressing: [] Met: [] Not Met: [] Adjusted   2. Patient will have a decrease in pain to facilitate improvement in movement, function, and ADLs as indicated by Functional Deficits.    [] Progressing: [] Met: [] Not Met: [] Adjusted    Long Term Goals: To be achieved in: 4 weeks  1. Disability index score of 24% or less for the TANIA  to assist with reaching prior level of function.   [] Progressing: [] Met: [] Not Met: [] Adjusted  2. Patient will demonstrate increased AROM to WNL, good LS mobility, good hip ROM to allow for proper joint functioning as indicated by patients Functional Deficits.   [] Progressing: [] Met: [] Not Met: [] Adjusted  3. Patient will demonstrate an increase in Strength to 4+/5 or greater left hip abduction to allow for proper functional mobility as indicated by patients Functional Deficits.  [] Progressing: [] Met: [] Not Met: [] Adjusted  4. Patient will return to ADL and other functional activities without increased symptoms or restriction.   [] Progressing: [] Met: [] Not Met: [] Adjusted  5. Patient will return to walking > 30 minutes with <2/10 pain in low back. (patient specific functional goal)    [] Progressing: [] Met: [] Not  Met: [] Adjusted       Overall Progression Towards Functional goals/ Treatment Progress Update:  [] Patient is progressing as expected towards functional goals listed. [] Progression is slowed due to complexities/Impairments listed. [] Progression has been slowed due to co-morbidities. [x] Plan just implemented, too soon to assess goals progression <30days   [] Goals require adjustment due to lack of progress  [] Patient is not progressing as expected and requires additional follow up with physician  [] Other    Prognosis for POC: [x] Good [] Fair  [] Poor      Patient requires continued skilled intervention: [x] Yes  [] No    Treatment/Activity Tolerance:  [x] Patient able to complete treatment  [] Patient limited by fatigue  [] Patient limited by pain     [] Patient limited by other medical complications  [] Other: Did well in therapy today. Continues to see large improvement in lumbar pain and mobility since IE last week. Progressed both hip and core program today with moderate challenge and difficulty noted due to fatigue. HEP updated with instructions to work on core/hip strength 3-4x/week. Progress as tolerated with focus on further core and glute strengthening. Prognosis: [x] Good [] Fair  [] Poor    Patient Requires Follow-up: [x] Yes  [] No    PLAN: See eval  [] Continue per plan of care [] Alter current plan (see comments)  [x] Plan of care initiated [] Hold pending MD visit [] Discharge    Electronically signed by: Maurice Kaiser, PT, DPT, OCS    *If patient does not return for further follow ups after this date. Please consider this as the patients discharge from physical therapy.

## 2023-03-01 ENCOUNTER — HOSPITAL ENCOUNTER (OUTPATIENT)
Dept: PHYSICAL THERAPY | Age: 68
Setting detail: THERAPIES SERIES
Discharge: HOME | End: 2023-03-01
Payer: MEDICARE

## 2023-03-01 PROCEDURE — 97112 NEUROMUSCULAR REEDUCATION: CPT

## 2023-03-01 PROCEDURE — 97110 THERAPEUTIC EXERCISES: CPT

## 2023-03-01 NOTE — FLOWSHEET NOTE
[address] [facility], [place]    Physical Therapy Daily Treatment Note  Date:  3/1/2023    Patient Name:  Lico Patel    :  1955  MRN: [id number]  Restrictions/Precautions:    Medical/Treatment Diagnosis Information:  Diagnosis: M43.16 (ICD-10-CM) - Spondylolisthesis of lumbar region  M54.50 (ICD-10-CM) - Lumbar pain  M48.061 (ICD-10-CM) - Spinal stenosis of lumbar region, unspecified whether neurogenic claudication present  Treatment Diagnosis: M54.5 Lumbar Pain; R53.1 Weakness  Insurance/Certification information:  PT Insurance Information: [facility] Medicare; no deducitble; $20 copay; no auth  Physician Information:  Referring Provider (secondary): Dr. Christian Bansal  Has the plan of care been signed (Y/N):        [x]  Yes  []  No     Date of Patient follow up with Physician: after MRI (MRI scheduled 3/2/23)      Is this a Progress Report:     []  Yes  [x]  No        If Yes:  Date Range for reporting period:  Beginnin23  Ending    Progress report will be due (10 Rx or 30 days whichever is less): 3/77/78       Recertification will be due (POC Duration  / 90 days whichever is less): 3/22/23         Visit # Insurance Allowable Auth Required   4 BMN []  Yes [x]  No        OUTCOME MEASURE DATE % Deficit   TANIA 23 48%                 Latex Allergy:  [x]NO      []YES  Preferred Language for Healthcare:   [x]English       []other:      Pain level:  0/10     SUBJECTIVE:  Doing well. No back pain still. Glutes still sore form Monday morning L>R.       OBJECTIVE:       23  ROM   Comments   Trunk flexion To ankles Feels tight/stiff; slow moving; pain upon extending to neutral   Trunk extension Very limited Increased sharp pain left lumbar spine   Trunk R sidebend Mid thigh \"Feels relief\" left lumbar pain   Trunk L sidebend 25% limited Pain in lower left lumbar spine   Trunk R rotation       Trunk L rotation       HS flexibility WNL both sides SLR ~80 deg 2/22/23  Strength Left Right Comments   Hip flexion(L2)         Knee extension(L3)         Knee flexion(S1-2)         Ankle dorsiflexion(L4)         Toe extension(L5)         Ankle eversion/plantar flexion(S1)         Hip abd  3+   Increased pain in low back and hip      2/22/23  Special tests   Comments   SLR Neg just HS tightness     Slump test       Pelvic symmetry  WNL standing     Segmental Spinal mobility       FADDIR Groin pain on left     Scour Groin pain on left                        2/22/23 deferred  Dermatomes Normal Abnormal Comments   Inguinal area (L1)          Anterior mid-thigh (L2)         Distal ant thigh/med knee (L3)         Medial lower leg and foot (L4)         Lateral lower leg and foot (L5)         Posterior calf (S1)         Medial calcaneus (S2)                                          2/22/23 deferred  Reflexes Normal Abnormal Comments   S1-2 Seated achilles         S1-2 Prone knee bend         L3-4 Patellar tendon         C5-6 Biceps         C6 Brachioradialis         C7-8 Triceps         Clonus         Babinski         Winters's                  Joint mobility:               []Normal               [x]Hypo: decreased  and left UPAs L1-L5              []Hyper     Palpation: mild increased tone and TTP along left lumbar paraspinals and QL; moderate TTP left IT band     Functional Mobility/Transfers: slow moving; guarded; painful with transitions     Posture: decreased lumbar lordosis     Bandages/Dressings/Incisions: n/a     Gait: (include devices/WB status) antalgic;guarded; slow moving      RESTRICTIONS/PRECAUTIONS: Prostate cancer (removed 2022); HTN; HLD; Hernia in 2008  Exercises/Interventions:   ROM/stretches     SKTC 3x30\"ea       Hand Heel Rock     Child's Pose 3x30\"    Prone Press Up 5x10\" pain-free    Hamstring     Quadriceps 3x30\"ea    Piriformis     Figure 4        Hook lying rotation 10x10\"    Cat and camel          Strengthening     CORE        TA March     TA Heel Slide     TA BKFO     Table Top 3x10ea Arms held in 80 green TB   Dead Bug 2x10ea Red Band   Bird Dog 2x10ea Red Band   Front Plank 3x20\" Toes / forearms   Modified Side Plank 2x15\"ea    Stir the [place] 3 laps; 10# KB R side x 3  L side x 3             GLUTES     Glute iso     Standing hip abd iso  Ball into wall   Bridge 3x10 Red loop at knees   Supine Clamshell     Side lying clamshell 3x10ea Red   SLR Abduction 3x10 L Only    SLR Extension     Lateral band walks     [employer]                        Manual Intervention  Time / Reps Notes        Prone PA        SI Manip     Lumbar Traction               Therapeutic Exercise and NMR EXR  [x] (81741) Provided verbal/tactile cueing for activities related to strengthening, flexibility, endurance, ROM  for improvements in proximal hip and core control with self care, mobility, lifting and ambulation. [x] (23276) Provided verbal/tactile cueing for activities related to improving balance, coordination, kinesthetic sense, posture, motor skill, proprioception  to assist with core control in self care, mobility, lifting, and ambulation.      Therapeutic Activities:    [x] (87776 or 94259) Provided verbal/tactile cueing for activities related to improving balance, coordination, kinesthetic sense, posture, motor skill, proprioception and motor activation to allow for proper function  with self care and ADLs  [] (54657) Provided training and instruction to the patient for proper core and proximal hip recruitment and positioning with ambulation re-education     Home Exercise Program:    [x] (71478) Reviewed/Progressed HEP activities related to strengthening, flexibility, endurance, ROM of core, proximal hip and LE for functional self-care, mobility, lifting and ambulation   [x] (15565) Reviewed/Progressed HEP activities related to improving balance, coordination, kinesthetic sense, posture, motor skill, proprioception of core, proximal hip and LE for self care, mobility, lifting, and ambulation      Patient instructed on HEP on this date with handout provided and all questions answered. Discussions about how to progress sets/reps/resistance as necessary for fatigue and challenge. Patient was instructed to contact PT with any questions or concerns about HEP moving forward. Patient verbally stated she/he understood  Access Code: [email or web address]  URL: [employer]/  Date: 02/27/2023  Prepared by: Krzysztof Valentin    Exercises  Hooklying Single Knee to Chest Stretch - 1 x daily - 7 x weekly - 3 sets - 30 hold  Child's Pose Stretch - 1 x daily - 7 x weekly - 3 sets - 30 hold  Supine Lower Trunk Rotation - 1 x daily - 7 x weekly - 10 reps - 10 hold  Supine 90/90 Alternating Heel Touches with Posterior Pelvic Tilt - 1 x daily - 3-4 x weekly - 3 sets - 10 reps  Supine Dead Bug with Leg Extension - 1 x daily - 3-4 x weekly - 3 sets - 10 reps  Bird Dog - 1 x daily - 3-4 x weekly - 3 sets - 10 reps  Sidelying Forearm Plank with Knees Bent - 1 x daily - 3-4 x weekly - 3 sets - 15 hold  Plank on Knees - 1 x daily - 3-4 x weekly - 3 sets - 20 hold  Standing Anti-Rotation Press with Anchored Resistance - 1 x daily - 3-4 x weekly - 10 reps - 10 hold  Supine Bridge with Resistance Band - 1 x daily - 3-4 x weekly - 3 sets - 10 reps  Sidelying Hip Abduction - 1 x daily - 3-4 x weekly - 3 sets - 10 reps  Clamshell with Resistance - 1 x daily - 3-4 x weekly - 3 sets - 10 reps      . Manual Treatments:  PROM / STM / Oscillations-Mobs:  G-I, II, III, IV (PA's, Inf., Post.)  [x] (01796) Provided manual therapy to mobilize proximal hip and LS spine soft tissue/joints for the purpose of modulating pain, promoting relaxation,  increasing ROM, reducing/eliminating soft tissue swelling/inflammation/restriction, improving soft tissue extensibility and allowing for proper ROM for normal function with self care, mobility, lifting and ambulation.      Modalities:     Charges:  Timed Code Treatment Minutes: 39'   Total Treatment Minutes: 39'       [] EVAL (LOW) 17177 (typically 20 minutes face-to-face)  [] EVAL (MOD) 02798 (typically 30 minutes face-to-face)  [] EVAL (HIGH) 47288 (typically 45 minutes face-to-face)  [] RE-EVAL     [x] YG(29735) x  1   [] IONTO  [x] NMR (79418) x   2  [] VASO  [] Manual (91544) x   1   [] Other:  [] TA x      [] Mech Traction (86058)  [] ES(attended) (57853)      [] ES (un) (22086):     GOALS:   Patient stated goal: Return to golfing    [] Progressing: [] Met: [] Not Met: [] Adjusted    Therapist goals for Patient:   Short Term Goals: To be achieved in: 2 weeks  1. Independent in HEP and progression per patient tolerance, in order to prevent re-injury. [] Progressing: [] Met: [] Not Met: [] Adjusted   2. Patient will have a decrease in pain to facilitate improvement in movement, function, and ADLs as indicated by Functional Deficits. [] Progressing: [] Met: [] Not Met: [] Adjusted    Long Term Goals: To be achieved in: 4 weeks  1. Disability index score of 24% or less for the TANIA  to assist with reaching prior level of function. [] Progressing: [] Met: [] Not Met: [] Adjusted  2. Patient will demonstrate increased AROM to WNL, good LS mobility, good hip ROM to allow for proper joint functioning as indicated by patients Functional Deficits. [] Progressing: [] Met: [] Not Met: [] Adjusted  3. Patient will demonstrate an increase in Strength to 4+/5 or greater left hip abduction to allow for proper functional mobility as indicated by patients Functional Deficits. [] Progressing: [] Met: [] Not Met: [] Adjusted  4. Patient will return to ADL and other functional activities without increased symptoms or restriction. [] Progressing: [] Met: [] Not Met: [] Adjusted  5.  Patient will return to walking > 30 minutes with <2/10 pain in low back. (patient specific functional goal)    [] Progressing: [] Met: [] Not Met: [] Adjusted       Overall Progression Towards Functional goals/ Treatment Progress Update:  [] Patient is progressing as expected towards functional goals listed. [] Progression is slowed due to complexities/Impairments listed. [] Progression has been slowed due to co-morbidities. [x] Plan just implemented, too soon to assess goals progression <30days   [] Goals require adjustment due to lack of progress  [] Patient is not progressing as expected and requires additional follow up with physician  [] Other    Prognosis for POC: [x] Good [] Fair  [] Poor      Patient requires continued skilled intervention: [x] Yes  [] No    Treatment/Activity Tolerance:  [x] Patient able to complete treatment  [] Patient limited by fatigue  [] Patient limited by pain     [] Patient limited by other medical complications  [] Other: Continues to do well. Added in resistance to dead bugs and bird dogs with more challenged noted by patient. Despite this, was able to keep neutral lumbopelvic positioning. Did start into small lumbar extension ROM today (prone press up) with cues for very light, pain-free pressure at end ranges. Progress as tolerated with focus on further core and glute strengthening. Prognosis: [x] Good [] Fair  [] Poor    Patient Requires Follow-up: [x] Yes  [] No    PLAN: See eval  [] Continue per plan of care [] Alter current plan (see comments)  [x] Plan of care initiated [] Hold pending MD visit [] Discharge    Electronically signed by: Vaishali Kwon, PT, DPT, OCS    *If patient does not return for further follow ups after this date. Please consider this as the patients discharge from physical therapy.

## 2023-03-03 ENCOUNTER — HOSPITAL ENCOUNTER (OUTPATIENT)
Dept: PHYSICAL THERAPY | Age: 68
Setting detail: THERAPIES SERIES
Discharge: HOME | End: 2023-03-03
Payer: MEDICARE

## 2023-03-03 PROCEDURE — 97530 THERAPEUTIC ACTIVITIES: CPT

## 2023-03-03 PROCEDURE — 97110 THERAPEUTIC EXERCISES: CPT

## 2023-03-03 NOTE — PLAN OF CARE
[address]   Physical Therapy Re-Certification Plan of Care    Dear  Dr. Joe Vieyra,    We had the pleasure of treating the following patient for physical therapy services at [address] [place] [address]. A summary of our findings can be found in the updated assessment below. This includes our plan of care. If you have any questions or concerns regarding these findings, please do not hesitate to contact me at [phone]. Thank you for the referral.     Physician Signature:________________________________Date:__________________  By signing above (or electronic signature), therapists plan is approved by physician      Overall Response to Treatment:   [x]Patient is responding well to treatment and improvement is noted with regards  to goals   [x]Patient should continue to improve in reasonable time if they continue HEP   []Patient has plateaued and is no longer responding to skilled PT intervention    []Patient is getting worse and would benefit from return to referring MD   []Patient unable to adhere to initial POC   [x]Other: Doing much better. Full ROM in lumbar spine with no pain reported. Strength 5 out of 5 in all planes today. Good mobility and no pain with lumbar PAs and no spasm in paraspinals his visit. HEP updated and patient was educated to keep up with core/hip strengthening program 2-3x/week. Patient leaves for [place] for 2 weeks on  and can return to PT if needed upon his return to [place]. He verbalized understanding and is agreeable to this plan.      Date range of Visits: 23-3/3/23     Total Visits: 5    Physical Therapy Daily Treatment Note  Date:  3/3/2023    Patient Name:  Maritza Winters    :  1955  MRN: [id number]  Restrictions/Precautions:    Medical/Treatment Diagnosis Information:  Diagnosis: M43.16 (ICD-10-CM) - Spondylolisthesis of lumbar region  M54.50 (ICD-10-CM) - Lumbar pain  M48.061 (ICD-10-CM) - Spinal stenosis of lumbar region, unspecified whether neurogenic claudication present  Treatment Diagnosis: M54.5 Lumbar Pain; R53.1 Weakness  Insurance/Certification information:  PT Insurance Information: [facility] Medicare; no deducitble; $20 copay; no auth  Physician Information:  Referring Provider (secondary): Dr. Evangelina Quinones  Has the plan of care been signed (Y/N):        [x]  Yes  []  No     Date of Patient follow up with Physician: after MRI (MRI scheduled 3/2/23)      Is this a Progress Report:     [x]  Yes  []  No        If Yes:  Date Range for reporting period:  Beginnin23  Ending: 3/3/23    Progress report will be due (10 Rx or 30 days whichever is less): 35       Recertification will be due (POC Duration  / 90 days whichever is less): 4/3/23         Visit # Insurance Allowable Auth Required   5 BMN []  Yes [x]  No        OUTCOME MEASURE DATE % Deficit   TANIA 23 48%   TANIA 3/3/23 0%          Latex Allergy:  [x]NO      []YES  Preferred Language for Healthcare:   [x]English       []other:      Pain level:  0/10     SUBJECTIVE:  Continuing to do well. Mild dull discomfort today but nothing that \"slows me down\". HEP going well. No pain after last visit. Goes out of town  for 2 weeks.       OBJECTIVE:       3/3/23  ROM   Comments   Trunk flexion Full No pain   Trunk extension WNL No pain   Trunk R sidebend knee Mild tightness end range   Trunk L sidebend knee No pain   Trunk R rotation       Trunk L rotation       HS flexibility WNL both sides SLR ~80 deg                      3/3/23  Strength Left Right Comments   Hip flexion(L2) 5 5      Knee extension(L3) 5 5      Knee flexion(S1-2) 5 5     Ankle dorsiflexion(L4)         Toe extension(L5)         Ankle eversion/plantar flexion(S1)         Hip abd  5 5    Hip Ext 5 5    Hip IR 5 5    Hip ER 5 5       3/3/23  Special tests   Comments   SLR Neg just HS tightness     Slump test       Pelvic symmetry  WNL standing     Segmental Spinal mobility 2/22/23 deferred  Dermatomes Normal Abnormal Comments   Inguinal area (L1)          Anterior mid-thigh (L2)         Distal ant thigh/med knee (L3)         Medial lower leg and foot (L4)         Lateral lower leg and foot (L5)         Posterior calf (S1)         Medial calcaneus (S2)                                          2/22/23 deferred  Reflexes Normal Abnormal Comments   S1-2 Seated achilles         S1-2 Prone knee bend         L3-4 Patellar tendon         C5-6 Biceps         C6 Brachioradialis         C7-8 Triceps         Clonus         Babinski         Winters's                  Joint mobility:               [x]Normal   and UPAs in lumbar spine 3/3/23              []Hypo:               []Hyper     Palpation: no increased tone in paraspinals or QL today 3/3/23     Functional Mobility/Transfers:  WNL 3/3/23     Posture: decreased lumbar lordosis     Bandages/Dressings/Incisions: n/a     Gait: (include devices/WB status) WNL 3/3/23      RESTRICTIONS/PRECAUTIONS: Prostate cancer (removed 2022); HTN; HLD; Hernia in 2008  Exercises/Interventions:   ROM/stretches     SKTC 3x30\"ea       Hand Heel Rock     Child's Pose 3x30\"    Prone Press Up 5x10\" pain-free    Hamstring     Quadriceps 3x30\"ea    Piriformis     Figure 4        Hook lying rotation 10x10\"    Cat and camel          Strengthening     CORE        TA March     TA Heel Slide     TA BKFO     Table Top 3x10ea Arms held in 90 green TB   Dead Bug 2x10ea Red Band   Bird Dog 2x10ea Red Band   Front Plank 3x20\" Toes / forearms   Modified Side Plank 2x15\"ea    Stir the [place] 3 laps; 10# KB R side x 3  L side x 3             GLUTES     Glute iso     Standing hip abd iso  Ball into wall   Bridge 3x10 Red loop at knees   Supine Clamshell     Side lying clamshell 3x10ea Red   SLR Abduction 3x10 L Only    SLR Extension     Lateral band walks     [facility] e-stim unit 5'        Manual Intervention Time / Reps Notes        Prone PA        SI Manip     Lumbar Traction               Therapeutic Exercise and NMR EXR  [x] (98943) Provided verbal/tactile cueing for activities related to strengthening, flexibility, endurance, ROM  for improvements in proximal hip and core control with self care, mobility, lifting and ambulation. [x] (32283) Provided verbal/tactile cueing for activities related to improving balance, coordination, kinesthetic sense, posture, motor skill, proprioception  to assist with core control in self care, mobility, lifting, and ambulation. Therapeutic Activities:    [x] (28965 or 70924) Provided verbal/tactile cueing for activities related to improving balance, coordination, kinesthetic sense, posture, motor skill, proprioception and motor activation to allow for proper function  with self care and ADLs  [] (40489) Provided training and instruction to the patient for proper core and proximal hip recruitment and positioning with ambulation re-education     Home Exercise Program:    [x] (81001) Reviewed/Progressed HEP activities related to strengthening, flexibility, endurance, ROM of core, proximal hip and LE for functional self-care, mobility, lifting and ambulation   [x] (46588) Reviewed/Progressed HEP activities related to improving balance, coordination, kinesthetic sense, posture, motor skill, proprioception of core, proximal hip and LE for self care, mobility, lifting, and ambulation      Patient instructed on HEP on this date with handout provided and all questions answered. Discussions about how to progress sets/reps/resistance as necessary for fatigue and challenge. Patient was instructed to contact PT with any questions or concerns about HEP moving forward. Patient verbally stated she/he understood  Access Code: [email or web address]  URL: [email or web address]  Date: 03/03/2023  Prepared by:  Krzysztof Valentin    Exercises  Hooklying Single Knee to Chest Stretch - 1 x daily - 7 x weekly - 3 sets - 30 hold  Child's Pose Stretch - 1 x daily - 7 x weekly - 3 sets - 30 hold  Supine Lower Trunk Rotation - 1 x daily - 7 x weekly - 10 reps - 10 hold  Prone Quadriceps Stretch with Strap - 1 x daily - 7 x weekly - 3 sets - 30\" hold  Supine 90/90 Alternating Heel Touches with Posterior Pelvic Tilt - 1 x daily - 3-4 x weekly - 3 sets - 10 reps  Supine Dead Bug with Resistance - 1 x daily - 3-4 x weekly - 3 sets - 10 reps  Bird Dog with Resistance - 1 x daily - 3-4 x weekly - 3 sets - 10 reps  Sidelying Forearm Plank with Knees Bent - 1 x daily - 3-4 x weekly - 3 sets - 15 hold  Plank on Knees - 1 x daily - 3-4 x weekly - 3 sets - 20 hold  Standing Anti-Rotation Press with Anchored Resistance - 1 x daily - 3-4 x weekly - 10 reps - 10 hold  Supine Bridge with Resistance Band - 1 x daily - 3-4 x weekly - 3 sets - 10 reps  Sidelying Hip Abduction - 1 x daily - 3-4 x weekly - 3 sets - 10 reps  Clamshell with Resistance - 1 x daily - 3-4 x weekly - 3 sets - 10 reps  Farmer's Carry with Kettlebells - 1 x daily - 3-4 x weekly - 3 sets - 10 reps    . Manual Treatments:  PROM / STM / Oscillations-Mobs:  G-I, II, III, IV (PA's, Inf., Post.)  [x] (32832) Provided manual therapy to mobilize proximal hip and LS spine soft tissue/joints for the purpose of modulating pain, promoting relaxation,  increasing ROM, reducing/eliminating soft tissue swelling/inflammation/restriction, improving soft tissue extensibility and allowing for proper ROM for normal function with self care, mobility, lifting and ambulation.      Modalities:     Charges:  Timed Code Treatment Minutes: 30   Total Treatment Minutes: 30       [] EVAL (LOW) 42475 (typically 20 minutes face-to-face)  [] EVAL (MOD) 90672 (typically 30 minutes face-to-face)  [] EVAL (HIGH) 81492 (typically 45 minutes face-to-face)  [] RE-EVAL     [x] QX(15195) x  1   [] IONTO  [] NMR (59160) x     [] VASO  [] Manual (92871) x   1   [] Other:  [x] TA x   1   [] [facility] Traction (71256)  [] ES(attended) (58572)      [] ES (un) (98534):     GOALS:   Patient stated goal: Return to golfing    [x] Progressing: [] Met: [] Not Met: [] Adjusted    Therapist goals for Patient:   Short Term Goals: To be achieved in: 2 weeks  1. Independent in HEP and progression per patient tolerance, in order to prevent re-injury. [] Progressing: [x] Met: [] Not Met: [] Adjusted   2. Patient will have a decrease in pain to facilitate improvement in movement, function, and ADLs as indicated by Functional Deficits. [] Progressing: [x] Met: [] Not Met: [] Adjusted    Long Term Goals: To be achieved in: 4 weeks  1. Disability index score of 24% or less for the TANIA  to assist with reaching prior level of function. [] Progressing: [x] Met: [] Not Met: [] Adjusted  2. Patient will demonstrate increased AROM to WNL, good LS mobility, good hip ROM to allow for proper joint functioning as indicated by patients Functional Deficits. [] Progressing: [x] Met: [] Not Met: [] Adjusted  3. Patient will demonstrate an increase in Strength to 4+/5 or greater left hip abduction to allow for proper functional mobility as indicated by patients Functional Deficits. [] Progressing: [x] Met: [] Not Met: [] Adjusted  4. Patient will return to ADL and other functional activities without increased symptoms or restriction. [] Progressing: [x] Met: [] Not Met: [] Adjusted  5. Patient will return to walking > 30 minutes with <2/10 pain in low back. (patient specific functional goal)    [] Progressing: [x] Met: [] Not Met: [] Adjusted       Overall Progression Towards Functional goals/ Treatment Progress Update:  [x] Patient is progressing as expected towards functional goals listed. [] Progression is slowed due to complexities/Impairments listed. [] Progression has been slowed due to co-morbidities.   [] Plan just implemented, too soon to assess goals progression <30days   [] Goals require adjustment due to lack of progress  [] Patient is not progressing as expected and requires additional follow up with physician  [] Other    Prognosis for POC: [x] Good [] Fair  [] Poor      Patient requires continued skilled intervention: [x] Yes  [] No    Treatment/Activity Tolerance:  [x] Patient able to complete treatment  [] Patient limited by fatigue  [] Patient limited by pain     [] Patient limited by other medical complications  [] Other: See above. Prognosis: [x] Good [] Fair  [] Poor    Patient Requires Follow-up: [x] Yes  [] No    PLAN: going out of town for 2 weeks; continue HEP; follow up with PT upon return to [place] if needed 3/3/23  [x] Continue per plan of care [] Alter current plan (see comments)  [] Plan of care initiated [] Hold pending MD visit [] Discharge    Electronically signed by: Madison Gutierrez, PT, DPT, OCS    *If patient does not return for further follow ups after this date. Please consider this as the patients discharge from physical therapy.

## 2023-04-04 ENCOUNTER — OFFICE VISIT (OUTPATIENT)
Dept: ORTHOPEDIC SURGERY | Age: 68
End: 2023-04-04
Payer: MEDICARE

## 2023-04-04 DIAGNOSIS — M43.16 SPONDYLOLISTHESIS OF LUMBAR REGION: ICD-10-CM

## 2023-04-04 DIAGNOSIS — M48.061 SPINAL STENOSIS OF LUMBAR REGION, UNSPECIFIED WHETHER NEUROGENIC CLAUDICATION PRESENT: ICD-10-CM

## 2023-04-04 DIAGNOSIS — M54.50 LUMBAR PAIN: Primary | ICD-10-CM

## 2023-04-04 PROCEDURE — 1123F ACP DISCUSS/DSCN MKR DOCD: CPT | Performed by: FAMILY MEDICINE

## 2023-04-04 PROCEDURE — 99213 OFFICE O/P EST LOW 20 MIN: CPT | Performed by: FAMILY MEDICINE

## 2023-04-04 NOTE — LETTER
April 4, 2023      Burk RiosDO  [address] [place] [address]      Patient: Dixie Brower   MR Number: [id number]   YOB: 1955   Date of Visit: 4/4/2023       Dear Melvi Poe: Thank you for referring Jaswant Galvan to me for evaluation/treatment. Below are the relevant portions of my assessment and plan of care. If you have questions, please do not hesitate to call me. I look forward to following Martin Mccrary along with you.     Sincerely,        Hilario Rouse MD

## 2023-04-04 NOTE — PROGRESS NOTES
Chief Complaint  Results (TR MRI LUMBAR )        FU recurrent severe mechanical lumbar pain with left to the right lateral hip pain and tightness of IT band with known history of lumbar spinal listhesis with multilevel disc protrusions established previously with Dr. Melvina Marina. Review of updated lumbar imaging    History of Present Illness:  Alejo Taylor is a 69 y.o. 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 Gray Camacho with recently found elevated creatinine to 1.5 who is being seen today upon self-referral for evaluation of persistence of low back pain and lateral hip discomfort. He states that he has been having increasing discomfort in his back since doing an exercise class in January 2021. There is no history of actual injury or trauma although he is seen last in 2017 for mechanical back pain with IT band and was found to have lumbar spondylolisthesis at L4-5 at that point. He is having some soreness and achiness to the lateral aspect of his left greater than right leg but this seems to be more consistent with IT band and that it has true radiculopathy. He does not recall any specific history of trauma or injury during his exercise class a few months back in January 2021. He is having pain with positional changes as well as rotating such as swinging a golf club. He has over the last couple of weeks in particular had worsening pain with getting up from a seated position. He is not having any pain into the distal lower extremity below the knee. He is very tight at baseline and admits he has been very lax in performing his stretching program in his back but just started this a couple of weeks ago. Denies neurogenic bowel or bladder symptoms or high-grade night pain. He does have pain with lifting and rotating activities.   He will have some discomfort with prolonged

## 2024-11-13 ENCOUNTER — HOSPITAL ENCOUNTER (OUTPATIENT)
Dept: GENERAL RADIOLOGY | Age: 69
Discharge: HOME | End: 2024-11-13
Payer: MEDICARE

## 2024-11-13 ENCOUNTER — HOSPITAL ENCOUNTER (OUTPATIENT)
Age: 69
Discharge: HOME | End: 2024-11-13
Payer: MEDICARE

## 2024-11-13 DIAGNOSIS — G89.29 CHRONIC LEFT SHOULDER PAIN: ICD-10-CM

## 2024-11-13 DIAGNOSIS — M25.512 CHRONIC LEFT SHOULDER PAIN: ICD-10-CM

## 2024-11-13 PROCEDURE — 73030 X-RAY EXAM OF SHOULDER: CPT

## 2024-11-15 ENCOUNTER — TELEPHONE (OUTPATIENT)
Dept: ORTHOPEDIC SURGERY | Age: 69
End: 2024-11-15

## 2024-11-15 NOTE — TELEPHONE ENCOUNTER
SPOKE TO PATIENT, HE IS CURRENTLY HAVING SHOULDER PAIN. IS A PATIENT OF DR. COLLINS FOR HIS LUMBAR SPINE AND HE ORDERED AN XRAY FOR HIM. PATIENT IS WANTING TO KNOW WHAT IS GOING ON WITH HIS SHOULDER. I TOLD HIM I COULD READ THE REPORT TO HIM BUT WITHOUT A CLINICAL EXAM, ITS JUST WORDS ON THE SCREEN. IT WILL NOT GIVE US A DIAGNOSIS. I OFFERED AN APPT FOR NEXT WEEK TO SEE DR. EVANGELISTA SO HE COULD BE EXAMINED. PATIENT IS HAVING SX ON TUESDAY FOR HIS HAND ([facility]THROPLASTY [facility] TENODESIS). I GAVE HIM AN APPT TIME FOR THURSDAY AND SAID HE COULD CANCEL IF NOT FEELING UP TO IT AFTER SURGERY.

## 2024-11-15 NOTE — TELEPHONE ENCOUNTER
General Question     Subject: CALL BACK  Patient and /or Facility Request: Ricky Post   Contact Number: [phone]      PATIENT CALLED REQ TO SPEAK WITH  PERTAINING A XR ORDER BEING SENT TO [facility] FOR HIS SHOULDER     PLEASE CALL PATIENT BACK AT THE ABOVE NUMBER

## 2024-11-20 SDOH — HEALTH STABILITY: PHYSICAL HEALTH: ON AVERAGE, HOW MANY MINUTES DO YOU ENGAGE IN EXERCISE AT THIS LEVEL?: 80 MIN

## 2024-11-20 SDOH — HEALTH STABILITY: PHYSICAL HEALTH: ON AVERAGE, HOW MANY DAYS PER WEEK DO YOU ENGAGE IN MODERATE TO STRENUOUS EXERCISE (LIKE A BRISK WALK)?: 7 DAYS

## 2024-11-21 ENCOUNTER — OFFICE VISIT (OUTPATIENT)
Dept: ORTHOPEDIC SURGERY | Age: 69
End: 2024-11-21

## 2024-11-21 VITALS — HEIGHT: 68 IN | WEIGHT: 148 LBS | BODY MASS INDEX: 22.43 KG/M2

## 2024-11-21 DIAGNOSIS — G89.29 CHRONIC LEFT SHOULDER PAIN: Primary | ICD-10-CM

## 2024-11-21 DIAGNOSIS — M25.512 CHRONIC LEFT SHOULDER PAIN: Primary | ICD-10-CM

## 2024-11-21 DIAGNOSIS — M75.82 TENDINITIS OF LEFT ROTATOR CUFF: ICD-10-CM

## 2024-11-21 DIAGNOSIS — M19.012 LOCALIZED OSTEOARTHRITIS OF LEFT SHOULDER: ICD-10-CM

## 2024-11-21 DIAGNOSIS — M25.812 IMPINGEMENT OF LEFT SHOULDER: ICD-10-CM

## 2024-11-21 DIAGNOSIS — M19.019 AC JOINT ARTHROPATHY: ICD-10-CM

## 2024-11-21 RX ORDER — BETAMETHASONE SODIUM PHOSPHATE AND BETAMETHASONE ACETATE 3; 3 MG/ML; MG/ML
12 INJECTION, SUSPENSION INTRA-ARTICULAR; INTRALESIONAL; INTRAMUSCULAR; SOFT TISSUE ONCE
Status: COMPLETED | OUTPATIENT
Start: 2024-11-21 | End: 2024-11-21

## 2024-11-21 RX ORDER — BUPIVACAINE HYDROCHLORIDE 2.5 MG/ML
4 INJECTION, SOLUTION INFILTRATION; PERINEURAL ONCE
Status: COMPLETED | OUTPATIENT
Start: 2024-11-21 | End: 2024-11-21

## 2024-11-21 RX ORDER — AMLODIPINE BESYLATE AND ATORVASTATIN CALCIUM 10; 10 MG/1; MG/1
TABLET, FILM COATED ORAL
COMMUNITY
End: 2024-11-21

## 2024-11-21 RX ADMIN — BUPIVACAINE HYDROCHLORIDE 10 MG: 2.5 INJECTION, SOLUTION INFILTRATION; PERINEURAL at 10:24

## 2024-11-21 RX ADMIN — Medication 3 ML: at 10:24

## 2024-11-21 RX ADMIN — BETAMETHASONE SODIUM PHOSPHATE AND BETAMETHASONE ACETATE 12 MG: 3; 3 INJECTION, SUSPENSION INTRA-ARTICULAR; INTRALESIONAL; INTRAMUSCULAR; SOFT TISSUE at 10:23

## 2024-11-21 NOTE — PROGRESS NOTES
labral testing his speeds testing appears to be reasonably benign there is no evidence of biceps instability.  He does have some pain with axial load testing about the cervical spine but I will call her Spurling's negative.  No evidence of instability.       Skin: There are no rashes, ulcerations or lesions.  Distal motor sensory and vascular exam is intact.       Gait: Fluid smooth gait.    Reflexes:  Symmetrically preserved.       Additional Comments:        Additional Examinations:  Contralateral Exam: Examination of the right shoulder reveals no atrophy or deformity.  The skin is warm and dry.  Range of motion is within normal limits.  There is only minimal tenderness over the greater tuberosity without biceps tenderness.  Only mild discomfort with impingement testing on the right.    No AC joint tenderness. Negative Neer's and Mccartney-Mike exams.  Strength is graded 5/5 throughout.    Right Upper Extremity:  Examination of the right upper extremity does not show any tenderness, deformity or injury.  Range of motion is unremarkable.  There is no gross instability.  There are no rashes, ulcerations or lesions.  Strength and tone are normal.  Left Upper Extremity: Examination of the left upper extremity does not show any tenderness, deformity or injury.  Range of motion is unremarkable.  There is no gross instability.  There are no rashes, ulcerations or lesions.  Strength and tone are normal.         Diagnostic Test Findings:   Right shoulder true AP pain of the left and axillary films were reviewed from 11/13/2024 which does show evidence of at least moderate AC arthropathy with mild glenohumeral arthropathy and inferior glenoid spurring         Assessment: #1.  2 to 3 weeks status post symptomatic left shoulder pain with evidence of AC arthropathy suspected rotator cuff tendinopathy with impingement       Impression:    Encounter Diagnoses   Name Primary?    Chronic left shoulder pain Yes    Localized

## 2024-11-25 ENCOUNTER — HOSPITAL ENCOUNTER (OUTPATIENT)
Dept: PHYSICAL THERAPY | Age: 69
Setting detail: THERAPIES SERIES
Discharge: HOME | End: 2024-11-25
Payer: MEDICARE

## 2024-11-25 DIAGNOSIS — M25.511 BILATERAL SHOULDER PAIN, UNSPECIFIED CHRONICITY: Primary | ICD-10-CM

## 2024-11-25 DIAGNOSIS — Z78.9 DECREASED ACTIVITIES OF DAILY LIVING (ADL): ICD-10-CM

## 2024-11-25 DIAGNOSIS — M25.512 BILATERAL SHOULDER PAIN, UNSPECIFIED CHRONICITY: Primary | ICD-10-CM

## 2024-11-25 PROCEDURE — 97110 THERAPEUTIC EXERCISES: CPT

## 2024-11-25 PROCEDURE — 97161 PT EVAL LOW COMPLEX 20 MIN: CPT

## 2024-11-25 NOTE — PLAN OF CARE
[facility] [address], [place] [address] office: [phone] fax: [phone]     Physical Therapy Initial Evaluation Certification      Dear Ricky Santoyo MD ,    We had the pleasure of evaluating the following patient for physical therapy services at [facility].  A summary of our findings can be found in the initial assessment below.  This includes our plan of care.  If you have any questions or concerns regarding these findings, please do not hesitate to contact me at the office phone number listed above.  Thank you for the referral.     Pt had bruising of the L anterior upper arm and pt appeared to have a Gonzalo deformity of the L biceps.  PT called and spoke with Kathe Montiel regarding the deformity.  Kathe scheduled pt with Dr. Santoyo for tomorrow.    Physician Signature:_______________________________Date:__________________  By signing above (or electronic signature), therapist’s plan is approved by physician       Physical Therapy: TREATMENT/PROGRESS NOTE   Patient: Ricky Post (69 y.o. male)   Examination Date: 2024   :  1955 MRN: [id number]   Visit #: 1   Insurance Allowable Auth Needed   tbd []Yes    []No    Insurance: Payor: [facility] MEDICARE / Plan: [facility] MEDICARE ADVANTAGE / Product Type: *No Product type* /   Insurance ID: [id number] - (Medicare Managed)  Secondary Insurance (if applicable):    Treatment Diagnosis:     ICD-10-CM    1. Bilateral shoulder pain, unspecified chronicity  M25.511     M25.512       2. Decreased activities of daily living (ADL)  Z78.9          Medical Diagnosis:  Chronic left shoulder pain [M25.512, G89.29]  Localized osteoarthritis of left shoulder [M19.012]  Tendinitis of left rotator cuff [M75.82]  AC joint arthropathy [M19.019]  Impingement of left shoulder [M25.812]   Referring Physician: Ricky Santoyo MD  PCP: Ning Poe DO     Plan of care signed (Y/N):  Qbrexza Pregnancy And Lactation Text: There is no available data on Qbrexza use in pregnant women.  There is no available data on Qbrexza use in lactation.

## 2024-11-26 ENCOUNTER — TELEPHONE (OUTPATIENT)
Dept: ORTHOPEDIC SURGERY | Age: 69
End: 2024-11-26

## 2024-11-26 ENCOUNTER — OFFICE VISIT (OUTPATIENT)
Dept: ORTHOPEDIC SURGERY | Age: 69
End: 2024-11-26
Payer: MEDICARE

## 2024-11-26 DIAGNOSIS — G89.29 CHRONIC LEFT SHOULDER PAIN: ICD-10-CM

## 2024-11-26 DIAGNOSIS — M25.512 CHRONIC LEFT SHOULDER PAIN: ICD-10-CM

## 2024-11-26 DIAGNOSIS — M75.82 TENDINITIS OF LEFT ROTATOR CUFF: ICD-10-CM

## 2024-11-26 DIAGNOSIS — S46.212A RUPTURE OF LEFT PROXIMAL BICEPS TENDON, INITIAL ENCOUNTER: ICD-10-CM

## 2024-11-26 DIAGNOSIS — M19.012 LOCALIZED OSTEOARTHRITIS OF LEFT SHOULDER: ICD-10-CM

## 2024-11-26 DIAGNOSIS — M19.019 AC JOINT ARTHROPATHY: ICD-10-CM

## 2024-11-26 DIAGNOSIS — M25.522 LEFT ELBOW PAIN: Primary | ICD-10-CM

## 2024-11-26 DIAGNOSIS — M25.812 IMPINGEMENT OF LEFT SHOULDER: ICD-10-CM

## 2024-11-26 PROCEDURE — 99214 OFFICE O/P EST MOD 30 MIN: CPT | Performed by: FAMILY MEDICINE

## 2024-11-26 PROCEDURE — 1123F ACP DISCUSS/DSCN MKR DOCD: CPT | Performed by: FAMILY MEDICINE

## 2024-11-26 SDOH — HEALTH STABILITY: PHYSICAL HEALTH: ON AVERAGE, HOW MANY MINUTES DO YOU ENGAGE IN EXERCISE AT THIS LEVEL?: 80 MIN

## 2024-11-26 SDOH — HEALTH STABILITY: PHYSICAL HEALTH: ON AVERAGE, HOW MANY DAYS PER WEEK DO YOU ENGAGE IN MODERATE TO STRENUOUS EXERCISE (LIKE A BRISK WALK)?: 7 DAYS

## 2024-11-26 NOTE — PROGRESS NOTES
Chief Complaint    Arm Pain (OPNP L ARM )    Initial evaluation new onset left biceps deformity with partially improved left shoulder pain with suspected cuff tendinopathy AC arthropathy and impingement    History of Present Illness:  Ricky Post is a 69 y.o. 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 Ning Poe who is being seen today upon self-referral for evaluation of progressive pain to his left greater than right shoulder.  He states that he has had chronic episodic aching pain to his shoulders for at least several years but is noticed over the past couple of weeks since early November 2024 that his left shoulder is been bothering him more consistently.  There is no real history of injury no activity prior to becoming symptomatic.  He describes more anterior and lateral discomfort which is achy although will be quite sharp with gripping and grasping and pronation supination tasks using his left arm which does produce some anterior discomfort but also difficulty with overhead activity.  He has not noticed swelling or bruising.  He has had some popping crepitation but no locking or catching.  He did mention this to his physiatrist when he was following up for his back he sent him for x-rays out in [place] on 11/13/2024 where he had 3 view shoulder films showing evidence of AC arthropathy.  Older he does have some glenohumeral arthropathy but no end-stage disease or acute osseous injury.  He has been taking episodic Tylenol and does complain of more achy pain at rest at 2-3 out of 10 but overhead activities he and lifting him pretty sharp her pain is 6-7 out of 10.  He is having lesser degrees of pain with his right shoulder and does deny locking catching or true instability symptoms bilaterally.  He does have some mild weakness and denies substantial neck pain or radicular

## 2024-11-26 NOTE — TELEPHONE ENCOUNTER
Spoke to patient and informed them that their MRI has been authorized and that they can call and schedule scan at their convenience. Also told them that they can call and schedule a f/u with Dr. Santoyo once they have MRI scheduled, leaving at least 2-3 days for our office to receive their results.

## 2024-12-18 ENCOUNTER — TELEPHONE (OUTPATIENT)
Dept: ORTHOPEDIC SURGERY | Age: 69
End: 2024-12-18

## 2024-12-18 NOTE — TELEPHONE ENCOUNTER
General Question     Subject: LATER APPOINTMENT  Patient and /or Facility Request: Ricky Post   Contact Number: [phone]     PATIENT HAD TO [facility] MORNING APPOINTMENT HAS A CARDIO APPOINTMENT ABOUT POSSIBLE SX REQUESTED CALL BACK IF CAN GET INTO A LATER APPOINTMENT IN AFTERNOON    PLEASE CALL TO ADVISE

## 2024-12-26 ENCOUNTER — OFFICE VISIT (OUTPATIENT)
Dept: ORTHOPEDIC SURGERY | Age: 69
End: 2024-12-26
Payer: MEDICARE

## 2024-12-26 VITALS — HEIGHT: 68 IN | BODY MASS INDEX: 22.43 KG/M2 | WEIGHT: 148 LBS

## 2024-12-26 DIAGNOSIS — M25.512 CHRONIC LEFT SHOULDER PAIN: ICD-10-CM

## 2024-12-26 DIAGNOSIS — M25.522 LEFT ELBOW PAIN: ICD-10-CM

## 2024-12-26 DIAGNOSIS — G89.29 CHRONIC LEFT SHOULDER PAIN: ICD-10-CM

## 2024-12-26 DIAGNOSIS — S46.212A RUPTURE OF LEFT PROXIMAL BICEPS TENDON, INITIAL ENCOUNTER: Primary | ICD-10-CM

## 2024-12-26 DIAGNOSIS — M75.122 NONTRAUMATIC COMPLETE TEAR OF LEFT ROTATOR CUFF: ICD-10-CM

## 2024-12-26 PROCEDURE — 1123F ACP DISCUSS/DSCN MKR DOCD: CPT | Performed by: FAMILY MEDICINE

## 2024-12-26 PROCEDURE — 1159F MED LIST DOCD IN RCRD: CPT | Performed by: FAMILY MEDICINE

## 2024-12-26 PROCEDURE — 99213 OFFICE O/P EST LOW 20 MIN: CPT | Performed by: FAMILY MEDICINE

## 2024-12-26 NOTE — PROGRESS NOTES
Chief Complaint    Follow-up ([facility] L SHOULDER- MRI TR)    FU new onset left biceps deformity with partially improved left shoulder pain with suspected cuff tendinopathy AC arthropathy and impingement with likely newer onset proximal biceps tendon rupture.  Review of left shoulder MRI    History of Present Illness:  Ricky Post is a 69 y.o. 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 Ning Poe who is being seen today upon self-referral for evaluation of progressive pain to his left greater than right shoulder.  He states that he has had chronic episodic aching pain to his shoulders for at least several years but is noticed over the past couple of weeks since early November 2024 that his left shoulder is been bothering him more consistently.  There is no real history of injury no activity prior to becoming symptomatic.  He describes more anterior and lateral discomfort which is achy although will be quite sharp with gripping and grasping and pronation supination tasks using his left arm which does produce some anterior discomfort but also difficulty with overhead activity.  He has not noticed swelling or bruising.  He has had some popping crepitation but no locking or catching.  He did mention this to his physiatrist when he was following up for his back he sent him for x-rays out in [place] on 11/13/2024 where he had 3 view shoulder films showing evidence of AC arthropathy.  Older he does have some glenohumeral arthropathy but no end-stage disease or acute osseous injury.  He has been taking episodic Tylenol and does complain of more achy pain at rest at 2-3 out of 10 but overhead activities he and lifting him pretty sharp her pain is 6-7 out of 10.  He is having lesser degrees of pain with his right shoulder and does deny locking catching or true instability symptoms bilaterally.

## 2025-02-20 ENCOUNTER — OFFICE VISIT (OUTPATIENT)
Dept: ORTHOPEDIC SURGERY | Age: 70
End: 2025-02-20

## 2025-02-20 VITALS — BODY MASS INDEX: 22.43 KG/M2 | HEIGHT: 68 IN | WEIGHT: 148 LBS

## 2025-02-20 DIAGNOSIS — M79.671 RIGHT FOOT PAIN: Primary | ICD-10-CM

## 2025-02-20 DIAGNOSIS — M72.2 PLANTAR FASCIITIS OF RIGHT FOOT: ICD-10-CM

## 2025-02-20 DIAGNOSIS — Q66.72 PES CAVUS OF BOTH FEET: ICD-10-CM

## 2025-02-20 DIAGNOSIS — Q66.71 PES CAVUS OF BOTH FEET: ICD-10-CM

## 2025-02-20 RX ORDER — BUPIVACAINE HYDROCHLORIDE 2.5 MG/ML
1 INJECTION, SOLUTION INFILTRATION; PERINEURAL ONCE
Status: COMPLETED | OUTPATIENT
Start: 2025-02-20 | End: 2025-02-20

## 2025-02-20 RX ORDER — BETAMETHASONE SODIUM PHOSPHATE AND BETAMETHASONE ACETATE 3; 3 MG/ML; MG/ML
6 INJECTION, SUSPENSION INTRA-ARTICULAR; INTRALESIONAL; INTRAMUSCULAR; SOFT TISSUE ONCE
Status: COMPLETED | OUTPATIENT
Start: 2025-02-20 | End: 2025-02-20

## 2025-02-20 RX ORDER — LIDOCAINE HYDROCHLORIDE 10 MG/ML
1 INJECTION, SOLUTION INFILTRATION; PERINEURAL ONCE
Status: COMPLETED | OUTPATIENT
Start: 2025-02-20 | End: 2025-02-20

## 2025-02-20 RX ADMIN — BETAMETHASONE SODIUM PHOSPHATE AND BETAMETHASONE ACETATE 6 MG: 3; 3 INJECTION, SUSPENSION INTRA-ARTICULAR; INTRALESIONAL; INTRAMUSCULAR; SOFT TISSUE at 09:00

## 2025-02-20 RX ADMIN — BUPIVACAINE HYDROCHLORIDE 2.5 MG: 2.5 INJECTION, SOLUTION INFILTRATION; PERINEURAL at 09:01

## 2025-02-20 RX ADMIN — LIDOCAINE HYDROCHLORIDE 1 ML: 10 INJECTION, SOLUTION INFILTRATION; PERINEURAL at 09:01

## 2025-02-20 NOTE — PROGRESS NOTES
prior to this.  There is no history of injury or trauma.  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  After discussing options, we did perform a right plantar fascial injection today using 1 cc of Celestone, 1 cc of Marcaine, 1 cc of Xylocaine.  With his cardiac history, he may utilize Voltaren gel 4 g 4 times daily to his plantar fascia we did instruct him on a good calf and plantar fascial stretching program and will try home exercise initially as he has improved about 70 to 80% from this past weekend.  He will utilize his custom orthotics from [employer] or his off-the-shelf inserts although I did encourage him to check with [employer] [facility] as he did have custom orthotics made a couple of years ago but were uncomfortable and never went back to have them adjusted.  Will see him back in 3 to 4 weeks for follow-up if his symptoms persist.  We may consider formal therapy or imaging.  He will contact us in the interim with questions or concerns        This dictation was performed with a verbal recognition program (DRAGON) and it was checked for errors. It is possible that there are still dictated errors within this office note. If so, please bring any errors to my attention for an addendum. All efforts were made to ensure that this office note is accurate.

## 2025-07-15 ENCOUNTER — OFFICE VISIT (OUTPATIENT)
Dept: ORTHOPEDIC SURGERY | Age: 70
End: 2025-07-15

## 2025-07-15 VITALS — BODY MASS INDEX: 22.43 KG/M2 | HEIGHT: 68 IN | WEIGHT: 148 LBS

## 2025-07-15 DIAGNOSIS — M75.82 TENDINITIS OF LEFT ROTATOR CUFF: ICD-10-CM

## 2025-07-15 DIAGNOSIS — M25.511 RIGHT SHOULDER PAIN, UNSPECIFIED CHRONICITY: ICD-10-CM

## 2025-07-15 DIAGNOSIS — G89.29 CHRONIC LEFT SHOULDER PAIN: ICD-10-CM

## 2025-07-15 DIAGNOSIS — M75.101 TEAR OF RIGHT ROTATOR CUFF, UNSPECIFIED TEAR EXTENT, UNSPECIFIED WHETHER TRAUMATIC: ICD-10-CM

## 2025-07-15 DIAGNOSIS — M75.122 NONTRAUMATIC COMPLETE TEAR OF LEFT ROTATOR CUFF: Primary | ICD-10-CM

## 2025-07-15 DIAGNOSIS — M19.012 LOCALIZED OSTEOARTHRITIS OF LEFT SHOULDER: ICD-10-CM

## 2025-07-15 DIAGNOSIS — M25.512 CHRONIC LEFT SHOULDER PAIN: ICD-10-CM

## 2025-07-15 RX ORDER — BETAMETHASONE SODIUM PHOSPHATE AND BETAMETHASONE ACETATE 3; 3 MG/ML; MG/ML
24 INJECTION, SUSPENSION INTRA-ARTICULAR; INTRALESIONAL; INTRAMUSCULAR; SOFT TISSUE ONCE
Status: COMPLETED | OUTPATIENT
Start: 2025-07-15 | End: 2025-07-15

## 2025-07-15 RX ORDER — BUPIVACAINE HYDROCHLORIDE 2.5 MG/ML
4 INJECTION, SOLUTION INFILTRATION; PERINEURAL ONCE
Status: COMPLETED | OUTPATIENT
Start: 2025-07-15 | End: 2025-07-15

## 2025-07-15 RX ADMIN — Medication 2 ML: at 09:29

## 2025-07-15 RX ADMIN — BETAMETHASONE SODIUM PHOSPHATE AND BETAMETHASONE ACETATE 24 MG: 3; 3 INJECTION, SUSPENSION INTRA-ARTICULAR; INTRALESIONAL; INTRAMUSCULAR; SOFT TISSUE at 09:28

## 2025-07-15 RX ADMIN — BUPIVACAINE HYDROCHLORIDE 10 MG: 2.5 INJECTION, SOLUTION INFILTRATION; PERINEURAL at 09:29

## 2025-07-15 NOTE — PROGRESS NOTES
Chief Complaint    Shoulder Pain ([facility] SANJU shoulder)    Roughly 8-month status post left biceps deformity with partially improved left shoulder pain with previously documented large rotator cuff tear complete reactive supraspinatus and full-thickness interstitial articular surface tearing subscapularis with AC arthropathy and biceps tendon rupture.  Recurrent right shoulder pain with chronic rotator cuff tearing AC arthropathy impingement       History of Present Illness:  Ricky Post is a 69 y.o. 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 Ning Poe who is being seen today upon self-referral for evaluation of progressive pain to his left greater than right shoulder.  He states that he has had chronic episodic aching pain to his shoulders for at least several years but is noticed over the past couple of weeks since early November 2024 that his left shoulder is been bothering him more consistently.  There is no real history of injury no activity prior to becoming symptomatic.  He describes more anterior and lateral discomfort which is achy although will be quite sharp with gripping and grasping and pronation supination tasks using his left arm which does produce some anterior discomfort but also difficulty with overhead activity.  He has not noticed swelling or bruising.  He has had some popping crepitation but no locking or catching.  He did mention this to his physiatrist when he was following up for his back he sent him for x-rays out in [place] on 11/13/2024 where he had 3 view shoulder films showing evidence of AC arthropathy.  Older he does have some glenohumeral arthropathy but no end-stage disease or acute osseous injury.  He has been taking episodic Tylenol and does complain of more achy pain at rest at 2-3 out of 10 but overhead activities he and lifting him pretty sharp

## (undated) DEVICE — AVANOS* TUOHY EPIDURAL NEEDLE: Brand: AVANOS

## (undated) DEVICE — EPIDURAL TRAY: Brand: MEDLINE INDUSTRIES, INC.